# Patient Record
Sex: MALE | Race: WHITE | Employment: OTHER | URBAN - METROPOLITAN AREA
[De-identification: names, ages, dates, MRNs, and addresses within clinical notes are randomized per-mention and may not be internally consistent; named-entity substitution may affect disease eponyms.]

---

## 2023-03-10 ENCOUNTER — APPOINTMENT (OUTPATIENT)
Dept: GENERAL RADIOLOGY | Age: 83
DRG: 871 | End: 2023-03-10
Payer: MEDICARE

## 2023-03-10 ENCOUNTER — HOSPITAL ENCOUNTER (INPATIENT)
Age: 83
LOS: 5 days | Discharge: HOSPICE | DRG: 871 | End: 2023-03-15
Attending: INTERNAL MEDICINE | Admitting: INTERNAL MEDICINE
Payer: MEDICARE

## 2023-03-10 ENCOUNTER — APPOINTMENT (OUTPATIENT)
Dept: CT IMAGING | Age: 83
DRG: 871 | End: 2023-03-10
Payer: MEDICARE

## 2023-03-10 DIAGNOSIS — R41.0 DISORIENTATION: ICD-10-CM

## 2023-03-10 DIAGNOSIS — R77.8 ELEVATED TROPONIN: ICD-10-CM

## 2023-03-10 DIAGNOSIS — I95.9 HYPOTENSION, UNSPECIFIED HYPOTENSION TYPE: ICD-10-CM

## 2023-03-10 DIAGNOSIS — N17.9 AKI (ACUTE KIDNEY INJURY) (HCC): Primary | ICD-10-CM

## 2023-03-10 DIAGNOSIS — T68.XXXA HYPOTHERMIA, INITIAL ENCOUNTER: ICD-10-CM

## 2023-03-10 DIAGNOSIS — J18.9 PNEUMONIA OF LEFT LOWER LOBE DUE TO INFECTIOUS ORGANISM: ICD-10-CM

## 2023-03-10 DIAGNOSIS — T79.6XXA TRAUMATIC RHABDOMYOLYSIS, INITIAL ENCOUNTER (HCC): ICD-10-CM

## 2023-03-10 PROBLEM — R57.9 SHOCK (HCC): Status: ACTIVE | Noted: 2023-03-10

## 2023-03-10 LAB
ABO/RH: NORMAL
ALBUMIN SERPL-MCNC: 3.3 G/DL (ref 3.5–4.6)
ALP BLD-CCNC: 132 U/L (ref 35–104)
ALT SERPL-CCNC: 441 U/L (ref 0–41)
AMMONIA: 45 UMOL/L (ref 16–60)
ANION GAP SERPL CALCULATED.3IONS-SCNC: 25 MEQ/L (ref 9–15)
ANTIBODY SCREEN: NORMAL
APTT: 28.2 SEC (ref 24.4–36.8)
AST SERPL-CCNC: 159 U/L (ref 0–40)
BACTERIA: NEGATIVE /HPF
BANDED NEUTROPHILS RELATIVE PERCENT: 6 %
BASE EXCESS ARTERIAL: -7 (ref -3–3)
BASOPHILS ABSOLUTE: 0 K/UL (ref 0–0.2)
BASOPHILS RELATIVE PERCENT: 0.2 %
BILIRUB SERPL-MCNC: 2.3 MG/DL (ref 0.2–0.7)
BILIRUBIN URINE: ABNORMAL
BLOOD, URINE: ABNORMAL
BUN BLDV-MCNC: 92 MG/DL (ref 8–23)
CALCIUM IONIZED: 1.07 MMOL/L (ref 1.12–1.32)
CALCIUM SERPL-MCNC: 8.8 MG/DL (ref 8.5–9.9)
CHLORIDE BLD-SCNC: 102 MEQ/L (ref 95–107)
CLARITY: ABNORMAL
CO2: 19 MEQ/L (ref 20–31)
COLOR: ABNORMAL
CREAT SERPL-MCNC: 2.63 MG/DL (ref 0.7–1.2)
EOSINOPHILS ABSOLUTE: 0 K/UL (ref 0–0.7)
EOSINOPHILS RELATIVE PERCENT: 0.3 %
EPITHELIAL CELLS, UA: ABNORMAL /HPF (ref 0–5)
ETHANOL PERCENT: NORMAL G/DL
ETHANOL: <10 MG/DL (ref 0–0.08)
GFR SERPL CREATININE-BSD FRML MDRD: 21 ML/MIN/{1.73_M2}
GFR SERPL CREATININE-BSD FRML MDRD: 23.1 ML/MIN/{1.73_M2}
GLOBULIN: 2.9 G/DL (ref 2.3–3.5)
GLUCOSE BLD-MCNC: 100 MG/DL (ref 70–99)
GLUCOSE BLD-MCNC: 116 MG/DL
GLUCOSE BLD-MCNC: 116 MG/DL (ref 70–99)
GLUCOSE BLD-MCNC: 149 MG/DL (ref 70–99)
GLUCOSE URINE: NEGATIVE MG/DL
HCO3 ARTERIAL: 19 MMOL/L (ref 21–29)
HCT VFR BLD CALC: 41 % (ref 42–52)
HEMOGLOBIN: 10.3 GM/DL (ref 13.5–17.5)
HEMOGLOBIN: 13.6 G/DL (ref 14–18)
HYALINE CASTS: ABNORMAL /HPF (ref 0–5)
INR BLD: 1.2
KETONES, URINE: 15 MG/DL
LACTATE: 1.29 MMOL/L (ref 0.4–2)
LACTIC ACID, SEPSIS: 2 MMOL/L (ref 0.5–1.9)
LACTIC ACID: 4.4 MMOL/L (ref 0.5–2.2)
LEUKOCYTE ESTERASE, URINE: ABNORMAL
LYMPHOCYTES ABSOLUTE: 0.4 K/UL (ref 1–4.8)
LYMPHOCYTES RELATIVE PERCENT: 3 %
MAGNESIUM: 3 MG/DL (ref 1.7–2.4)
MCH RBC QN AUTO: 32 PG (ref 27–31.3)
MCHC RBC AUTO-ENTMCNC: 33 % (ref 33–37)
MCV RBC AUTO: 96.9 FL (ref 79–92.2)
MONOCYTES ABSOLUTE: 0.5 K/UL (ref 0.2–0.8)
MONOCYTES RELATIVE PERCENT: 3.8 %
NEUTROPHILS ABSOLUTE: 12.1 K/UL (ref 1.4–6.5)
NEUTROPHILS RELATIVE PERCENT: 88 %
NITRITE, URINE: NEGATIVE
O2 SAT, ARTERIAL: 91 % (ref 93–100)
PCO2 ARTERIAL: 34 MM HG (ref 35–45)
PDW BLD-RTO: 14.3 % (ref 11.5–14.5)
PERFORMED ON: ABNORMAL
PERFORMED ON: ABNORMAL
PH ARTERIAL: 7.36 (ref 7.35–7.45)
PH UA: 5.5 (ref 5–9)
PLATELET # BLD: 104 K/UL (ref 130–400)
PLATELET SLIDE REVIEW: ABNORMAL
PO2 ARTERIAL: 62 MM HG (ref 75–108)
POC CHLORIDE: 117 MEQ/L (ref 99–110)
POC CREATININE WHOLE BLOOD: 2.9
POC CREATININE: 2.8 MG/DL (ref 0.8–1.3)
POC FIO2: 3
POC HEMATOCRIT: 30 % (ref 41–53)
POC POTASSIUM: 2.8 MEQ/L (ref 3.5–5.1)
POC SAMPLE TYPE: ABNORMAL
POC SODIUM: 149 MEQ/L (ref 136–145)
POIKILOCYTES: ABNORMAL
POTASSIUM SERPL-SCNC: 3.6 MEQ/L (ref 3.4–4.9)
PROCALCITONIN: 1.9 NG/ML (ref 0–0.15)
PROTEIN UA: 30 MG/DL
PROTHROMBIN TIME: 15.1 SEC (ref 12.3–14.9)
RBC # BLD: 4.23 M/UL (ref 4.7–6.1)
RBC UA: ABNORMAL /HPF (ref 0–5)
SODIUM BLD-SCNC: 146 MEQ/L (ref 135–144)
SPECIFIC GRAVITY UA: 1.02 (ref 1–1.03)
TCO2 ARTERIAL: 20 MMOL/L (ref 21–32)
TOTAL CK: 2753 U/L (ref 0–190)
TOTAL PROTEIN: 6.2 G/DL (ref 6.3–8)
TROPONIN: 0.02 NG/ML (ref 0–0.01)
UROBILINOGEN, URINE: 1 E.U./DL
WBC # BLD: 12.9 K/UL (ref 4.8–10.8)
WBC UA: ABNORMAL /HPF (ref 0–5)

## 2023-03-10 PROCEDURE — 96365 THER/PROPH/DIAG IV INF INIT: CPT

## 2023-03-10 PROCEDURE — 6830039000 HC L3 TRAUMA ALERT

## 2023-03-10 PROCEDURE — 71250 CT THORAX DX C-: CPT

## 2023-03-10 PROCEDURE — 87040 BLOOD CULTURE FOR BACTERIA: CPT

## 2023-03-10 PROCEDURE — 2580000003 HC RX 258

## 2023-03-10 PROCEDURE — 93005 ELECTROCARDIOGRAM TRACING: CPT | Performed by: SURGERY

## 2023-03-10 PROCEDURE — 73502 X-RAY EXAM HIP UNI 2-3 VIEWS: CPT

## 2023-03-10 PROCEDURE — 2580000003 HC RX 258: Performed by: PHYSICIAN ASSISTANT

## 2023-03-10 PROCEDURE — 6360000002 HC RX W HCPCS: Performed by: PHYSICIAN ASSISTANT

## 2023-03-10 PROCEDURE — 87186 SC STD MICRODIL/AGAR DIL: CPT

## 2023-03-10 PROCEDURE — 71045 X-RAY EXAM CHEST 1 VIEW: CPT

## 2023-03-10 PROCEDURE — 2500000003 HC RX 250 WO HCPCS: Performed by: EMERGENCY MEDICINE

## 2023-03-10 PROCEDURE — 81003 URINALYSIS AUTO W/O SCOPE: CPT

## 2023-03-10 PROCEDURE — 74176 CT ABD & PELVIS W/O CONTRAST: CPT

## 2023-03-10 PROCEDURE — 2580000003 HC RX 258: Performed by: NURSE PRACTITIONER

## 2023-03-10 PROCEDURE — 82140 ASSAY OF AMMONIA: CPT

## 2023-03-10 PROCEDURE — 2580000003 HC RX 258: Performed by: EMERGENCY MEDICINE

## 2023-03-10 PROCEDURE — 99285 EMERGENCY DEPT VISIT HI MDM: CPT

## 2023-03-10 PROCEDURE — 85730 THROMBOPLASTIN TIME PARTIAL: CPT

## 2023-03-10 PROCEDURE — 86901 BLOOD TYPING SEROLOGIC RH(D): CPT

## 2023-03-10 PROCEDURE — 99222 1ST HOSP IP/OBS MODERATE 55: CPT | Performed by: SURGERY

## 2023-03-10 PROCEDURE — 87150 DNA/RNA AMPLIFIED PROBE: CPT

## 2023-03-10 PROCEDURE — 85025 COMPLETE CBC W/AUTO DIFF WBC: CPT

## 2023-03-10 PROCEDURE — 86900 BLOOD TYPING SEROLOGIC ABO: CPT

## 2023-03-10 PROCEDURE — 72128 CT CHEST SPINE W/O DYE: CPT

## 2023-03-10 PROCEDURE — 2700000000 HC OXYGEN THERAPY PER DAY

## 2023-03-10 PROCEDURE — 84145 PROCALCITONIN (PCT): CPT

## 2023-03-10 PROCEDURE — 96361 HYDRATE IV INFUSION ADD-ON: CPT

## 2023-03-10 PROCEDURE — 83605 ASSAY OF LACTIC ACID: CPT

## 2023-03-10 PROCEDURE — 84295 ASSAY OF SERUM SODIUM: CPT

## 2023-03-10 PROCEDURE — 82565 ASSAY OF CREATININE: CPT

## 2023-03-10 PROCEDURE — 82077 ASSAY SPEC XCP UR&BREATH IA: CPT

## 2023-03-10 PROCEDURE — 36600 WITHDRAWAL OF ARTERIAL BLOOD: CPT

## 2023-03-10 PROCEDURE — 02HV33Z INSERTION OF INFUSION DEVICE INTO SUPERIOR VENA CAVA, PERCUTANEOUS APPROACH: ICD-10-PCS | Performed by: EMERGENCY MEDICINE

## 2023-03-10 PROCEDURE — 82435 ASSAY OF BLOOD CHLORIDE: CPT

## 2023-03-10 PROCEDURE — 96368 THER/DIAG CONCURRENT INF: CPT

## 2023-03-10 PROCEDURE — 96360 HYDRATION IV INFUSION INIT: CPT

## 2023-03-10 PROCEDURE — 51702 INSERT TEMP BLADDER CATH: CPT

## 2023-03-10 PROCEDURE — 82330 ASSAY OF CALCIUM: CPT

## 2023-03-10 PROCEDURE — 72131 CT LUMBAR SPINE W/O DYE: CPT

## 2023-03-10 PROCEDURE — 86850 RBC ANTIBODY SCREEN: CPT

## 2023-03-10 PROCEDURE — 72125 CT NECK SPINE W/O DYE: CPT

## 2023-03-10 PROCEDURE — 73552 X-RAY EXAM OF FEMUR 2/>: CPT

## 2023-03-10 PROCEDURE — 80053 COMPREHEN METABOLIC PANEL: CPT

## 2023-03-10 PROCEDURE — 83735 ASSAY OF MAGNESIUM: CPT

## 2023-03-10 PROCEDURE — 36415 COLL VENOUS BLD VENIPUNCTURE: CPT

## 2023-03-10 PROCEDURE — 85610 PROTHROMBIN TIME: CPT

## 2023-03-10 PROCEDURE — 82550 ASSAY OF CK (CPK): CPT

## 2023-03-10 PROCEDURE — 2000000000 HC ICU R&B

## 2023-03-10 PROCEDURE — 84132 ASSAY OF SERUM POTASSIUM: CPT

## 2023-03-10 PROCEDURE — 70450 CT HEAD/BRAIN W/O DYE: CPT

## 2023-03-10 PROCEDURE — 82803 BLOOD GASES ANY COMBINATION: CPT

## 2023-03-10 PROCEDURE — 36556 INSERT NON-TUNNEL CV CATH: CPT

## 2023-03-10 PROCEDURE — 85014 HEMATOCRIT: CPT

## 2023-03-10 PROCEDURE — 84484 ASSAY OF TROPONIN QUANT: CPT

## 2023-03-10 RX ORDER — CYANOCOBALAMIN 1000 UG/ML
INJECTION, SOLUTION INTRAMUSCULAR; SUBCUTANEOUS
Status: ON HOLD | COMMUNITY
Start: 2023-02-16 | End: 2024-01-18

## 2023-03-10 RX ORDER — ASPIRIN 81 MG/1
TABLET ORAL DAILY
Status: ON HOLD | COMMUNITY
Start: 2011-04-13

## 2023-03-10 RX ORDER — TRIAMTERENE AND HYDROCHLOROTHIAZIDE 37.5; 25 MG/1; MG/1
CAPSULE ORAL
Status: ON HOLD | COMMUNITY
Start: 2022-11-02

## 2023-03-10 RX ORDER — APIXABAN 5 MG/1
TABLET, FILM COATED ORAL
Status: ON HOLD | COMMUNITY
Start: 2023-02-18

## 2023-03-10 RX ORDER — PRAVASTATIN SODIUM 20 MG
TABLET ORAL
Status: ON HOLD | COMMUNITY
Start: 2022-04-28

## 2023-03-10 RX ORDER — 0.9 % SODIUM CHLORIDE 0.9 %
1000 INTRAVENOUS SOLUTION INTRAVENOUS ONCE
Status: COMPLETED | OUTPATIENT
Start: 2023-03-10 | End: 2023-03-10

## 2023-03-10 RX ORDER — SODIUM CHLORIDE 9 MG/ML
INJECTION, SOLUTION INTRAVENOUS
Status: COMPLETED
Start: 2023-03-10 | End: 2023-03-10

## 2023-03-10 RX ORDER — METOPROLOL SUCCINATE 25 MG/1
TABLET, EXTENDED RELEASE ORAL DAILY
Status: ON HOLD | COMMUNITY
Start: 2023-01-12

## 2023-03-10 RX ORDER — 0.9 % SODIUM CHLORIDE 0.9 %
1000 INTRAVENOUS SOLUTION INTRAVENOUS ONCE
Status: COMPLETED | OUTPATIENT
Start: 2023-03-10 | End: 2023-03-11

## 2023-03-10 RX ORDER — PRAVASTATIN SODIUM 20 MG
TABLET ORAL
Status: ON HOLD | COMMUNITY
Start: 2023-02-10

## 2023-03-10 RX ORDER — LEVOFLOXACIN 5 MG/ML
750 INJECTION, SOLUTION INTRAVENOUS
Status: DISCONTINUED | OUTPATIENT
Start: 2023-03-10 | End: 2023-03-11

## 2023-03-10 RX ORDER — LEVOTHYROXINE SODIUM 0.12 MG/1
TABLET ORAL
Status: ON HOLD | COMMUNITY
Start: 2022-04-28

## 2023-03-10 RX ORDER — ENOXAPARIN SODIUM 100 MG/ML
1 INJECTION SUBCUTANEOUS DAILY
Status: DISCONTINUED | OUTPATIENT
Start: 2023-03-11 | End: 2023-03-15 | Stop reason: HOSPADM

## 2023-03-10 RX ADMIN — SODIUM CHLORIDE: 9 INJECTION, SOLUTION INTRAVENOUS at 23:30

## 2023-03-10 RX ADMIN — AZITHROMYCIN MONOHYDRATE 500 MG: 500 INJECTION, POWDER, LYOPHILIZED, FOR SOLUTION INTRAVENOUS at 20:21

## 2023-03-10 RX ADMIN — SODIUM CHLORIDE 1000 ML: 9 INJECTION, SOLUTION INTRAVENOUS at 18:09

## 2023-03-10 RX ADMIN — CEFTRIAXONE SODIUM 1000 MG: 1 INJECTION, POWDER, FOR SOLUTION INTRAMUSCULAR; INTRAVENOUS at 19:32

## 2023-03-10 RX ADMIN — SODIUM CHLORIDE 1000 ML: 9 INJECTION, SOLUTION INTRAVENOUS at 23:37

## 2023-03-10 RX ADMIN — SODIUM CHLORIDE 1000 ML: 9 INJECTION, SOLUTION INTRAVENOUS at 15:13

## 2023-03-10 RX ADMIN — SODIUM CHLORIDE 1000 ML: 9 INJECTION, SOLUTION INTRAVENOUS at 16:20

## 2023-03-10 RX ADMIN — NOREPINEPHRINE BITARTRATE 5 MCG/MIN: 1 INJECTION INTRAVENOUS at 19:47

## 2023-03-10 RX ADMIN — SODIUM CHLORIDE 1000 ML: 9 INJECTION, SOLUTION INTRAVENOUS at 17:30

## 2023-03-10 ASSESSMENT — ENCOUNTER SYMPTOMS
ABDOMINAL PAIN: 0
ABDOMINAL DISTENTION: 0
RHINORRHEA: 0
CONSTIPATION: 0
COLOR CHANGE: 0
EYE DISCHARGE: 0
SHORTNESS OF BREATH: 0
SORE THROAT: 0

## 2023-03-10 ASSESSMENT — LIFESTYLE VARIABLES: HOW OFTEN DO YOU HAVE A DRINK CONTAINING ALCOHOL: NEVER

## 2023-03-10 NOTE — ED NOTES
Waiting on BP to increase to go to CT     Negar Sorto RN  03/10/23 1052       Negar Sorto RN  03/10/23 [id number]

## 2023-03-10 NOTE — CONSULTS
Trauma Consult / H & P Note    Reason for Consult: Trauma  Consulting Provider: No att. providers found    BASIC INJURY INFORMATION:  Level of activation: CAT 2  Mode of transport: EMS  Mechanism of injury:  Found down, presumed fall from standing  Complicating features: NA  Protective measures: NA    HISTORY OF PRESENT INJURY:   Lakeisha Curry is a 78 y.o. 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 History is provided by the son. He reports that he believes his dad was down for ~4 days due to the number of newspapers on the porch. Due to the disruption in the bathroom, it is believed that the patient fell there and was unable to get up. Pt was covered in his own feces and urine. EMS had to kick down the door. Pt is awake but non-verbal and not following commands. PRIMARY SURVEY:  Airway: Intact  Breathing: Normal   Breath Sounds: Breath Sounds Equal Bilaterally  Circulation:    Pulses: Normal   Skin: Normal skin color, texture and turgor  Disability:   Pupils: Pt would open his eyes briefly but then squeeze them shut    GCS:    Best Eyes: 4    Best Verbal: 2    Best Motor: 4    Total: 10    Vitals:   Vitals:    03/10/23 1735 03/10/23 1740 03/10/23 1746 03/10/23 1748   BP: (!) 70/59 (!) 66/47 (!) 74/59    Pulse: 79 79 67    Resp: 19 26 26    Temp:   (!) 90.9 °F (32.7 °C)    TempSrc:    Bladder   SpO2:       Weight:       Height:             SECONDARY SURVEY:  Neurologic: Alert and Oriented to self, Moves all Extremities, Strength Symmetrical. Not following commands. HEENT:   Head: No lacerations, bony step-offs, or abrasions and Midface stable to palpation   Eyes: EOM intact. Unable to examine pupils due to patient squeezing his eyes shut   Ears: No Hemotympanum   Nose: Septum Midline, No crepitus with motion;    Throat: Oral cavity without trauma   Neck: No midline tenderness and No lacerations/wounds  Pulmonary: External exam: no crepitus or pain with palpation, no contusions or abrasions; and Lung exam: breath sounds clear, no wheezes, no rales  Cardiovascular:    Pulses: Bilateral radial, femoral, DP and PT pulses are normal;  Abdomen: Appearance: Non-distended, No scars, lacerations, contusions; and Palpation: no tenderness   Rectal: Not performed  Pelvis/Perineum: Normal appearing genitalia, Pelvis is stable to palpation;, and No blood noted at the urethral meatus;  Musculoskeletal:    Back/Spine: Noted to have lower back bruising   Extremities: Scattered bruising and skin tears to extremities. Moves all extremities.  No obvious deformities    PAST MEDICAL HISTORY: (obtained from son at bedside and care everywhere)  DVT lower extremities (12/2022)  HTN  HLD  Hypothyroidism  Skin cancer  Aortic stenosis     PAST SURGICAL HISTORY:  Multiple skin cancer removals    PRE-ADMISSION MEDICATIONS:   ASA 81mg  Synthroid  Eliquis  Pravastatin  Metoprolol    ALLERGIES:  Son denies any allergies     SOCIAL HISTORY:   Denies tobacco, alcohol and drug use    FAMILY HISTORY:  No family history of bleeding or clotting disorders      REVIEW OF SYSTEMS: Unable to obtain secondary to mental status       BASIC LABS:   CBC with Differential:    Lab Results   Component Value Date/Time    WBC 12.9 03/10/2023 02:30 PM    RBC 4.23 03/10/2023 02:30 PM    HGB 13.6 03/10/2023 02:30 PM    HCT 41.0 03/10/2023 02:30 PM     03/10/2023 02:30 PM    MCV 96.9 03/10/2023 02:30 PM    MCH 32.0 03/10/2023 02:30 PM    MCHC 33.0 03/10/2023 02:30 PM    RDW 14.3 03/10/2023 02:30 PM    BANDSPCT 6 03/10/2023 02:30 PM    LYMPHOPCT 3.0 03/10/2023 02:30 PM    MONOPCT 3.8 03/10/2023 02:30 PM    BASOPCT 0.2 03/10/2023 02:30 PM    MONOSABS 0.5 03/10/2023 02:30 PM    LYMPHSABS 0.4 03/10/2023 02:30 PM    EOSABS 0.0 03/10/2023 02:30 PM    BASOSABS 0.0 03/10/2023 02:30 PM CMP:   Lab Results   Component Value Date     (H) 03/10/2023    K 3.6 03/10/2023     03/10/2023    CO2 19 (L) 03/10/2023    BUN 92 (HH) 03/10/2023    CREATININE 2.63 (H) 03/10/2023    GLUCOSE 116 03/10/2023    CALCIUM 8.8 03/10/2023    PROT 6.2 (L) 03/10/2023    LABALBU 3.3 (L) 03/10/2023    BILITOT 2.3 (H) 03/10/2023    ALKPHOS 132 (H) 03/10/2023     (H) 03/10/2023     (H) 03/10/2023    LABGLOM 23.1 (L) 03/10/2023    GLOB 2.9 03/10/2023     Magnesium:   Lab Results   Component Value Date/Time    MG 3.0 03/10/2023 02:30 PM     Troponin:   Lab Results   Component Value Date/Time    TROPONINI 0.020 03/10/2023 02:30 PM     PT/INR:   Recent Labs     03/10/23  1430   PROTIME 15.1*   INR 1.2     APTT:   Recent Labs     03/10/23  1430   APTT 28.2     EtOH:   Lab Results   Component Value Date/Time    ETOH <10 03/10/2023 05:30 PM       RADIOLOGY: (Personally reviewed)  CT Head: Negative    CT C-Spine: Negative    CT T spine: Negative    CT L spine: Negative    CT Chest: Negative for trauma    CT Abdomen/Pelvis: Negative    XR Right hip: Negative    XR R femur: Negative      ASSESSMENT:  Lakeisha Curry is a [place] 1 y.o. 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 On exam, patient has scattered bruising and skin tears to his extremities and torso. GCS 10. A&O to self only. Pt was hypothermic on arrival and was placed on bear hugger and warm fluids infusing. Pt was initially normotensive but became hypotensive. FAST exam negative for free fluid. Pressures improved with IV fluid administration. Lab work reviewed and remarkable for leukocytosis 12.9, mild anemia, thrombocytopenia 104. Hypernatremia 146. BUN/Cr elevated at 92/2. 63. T bili 2.3, Alk phos 132, AST/Alt elevated at 441/159. Lactic acidosis 4.4.  Pro-tamera elevated at 1.90. Elevated troponin at 0.020. CK elevated at 2,753. Trauma workup found No injury      PLAN:  No indication for admission to trauma  Dispo per ED        Tea Hollingsworth MD  Trauma/Critical Care/General Surgery  [See treatment team sticky note for contact information]     This patient's plan of care was discussed and made in collaboration with Trauma Attending physician, Tea Hollingsworth MD.    Teaching Physician Note:  I saw and evaluated the patient. I personally obtained the key and critical portions of the history and physical exam.  I reviewed the GIUSEPPE's documentation and discussed the patient with the GIUSEPPE. I agree with the GIUSEPPE's medical decision making as documented in the GIUSEPPE note.         Tea Hollingsworth MD

## 2023-03-10 NOTE — ED PROVIDER NOTES
[address] [facility] ED  eMERGENCY dEPARTMENT eNCOUnter      Pt Name: Marina Barnard  MRN: [id number]  [place] 1940  Date of evaluation: 3/10/2023  Provider: Talib Chambers PA-C    CHIEF COMPLAINT       Chief Complaint   Patient presents with    Fall           HISTORY OF PRESENT ILLNESS   (Location/Symptom, Timing/Onset,Context/Setting, Quality, Duration, Modifying Factors, Severity)  Note limiting factors. Marina Barnard is a 78 y.o. male who presents to the emergency department as concerns for fall, altered mental status. According to EMS family has not been able to contact this patient for the last couple of days, they went to his house to check on him, he could not get in the door was locked, they states there were 4 newspapers on his front porch, believing that he may have been down in his home for possibly 4 days or more. When they got into the house, was patient was in the bathroom trapped behind the door, he was confused, he could not get up on his own, they state he was soaked in urine and feces wallpaper and shower curtain were torn down from the walls, as well as the bathroom. There was no broken appliances in the bathroom. Patient is alert, he does respond to in full stimuli, but does not verbalize. Does not follow commands. Bruises over multiple areas of body. Undetermined whether. Patient is on anticoagulation at this time. HPI    NursingNotes were reviewed. REVIEW OF SYSTEMS    (2-9 systems for level 4, 10 or more for level 5)     Review of Systems   Constitutional:  Negative for activity change and appetite change. HENT:  Negative for congestion, ear discharge, ear pain, nosebleeds, rhinorrhea and sore throat. Eyes:  Negative for discharge. Respiratory:  Negative for shortness of breath. Cardiovascular:  Negative for chest pain, palpitations and leg swelling. Gastrointestinal:  Negative for abdominal distention, abdominal pain and constipation.    Genitourinary: Negative for difficulty urinating and dysuria. Musculoskeletal:  Negative for arthralgias. Right hip pain   Skin:  Negative for color change. Neurological:  Negative for dizziness, syncope, numbness and headaches. Altered mental status   Psychiatric/Behavioral:  Negative for agitation and confusion. Except as noted above the remainder of the review of systems was reviewed and negative. PAST MEDICAL HISTORY   No past medical history on file. SURGICALHISTORY     No past surgical history on file. CURRENT MEDICATIONS       Previous Medications    ASPIRIN 81 MG EC TABLET    Take by mouth daily    CYANOCOBALAMIN 1000 MCG/ML INJECTION    Inject into the muscle    ELIQUIS 5 MG TABS TABLET    TAKE 1 TABLET BY MOUTH TWICE A DAY    LEVOTHYROXINE (SYNTHROID) 125 MCG TABLET    TAKE 1 TABLET BY MOUTH EVERY DAY    METOPROLOL SUCCINATE (TOPROL XL) 25 MG EXTENDED RELEASE TABLET    Take by mouth daily    PRAVASTATIN (PRAVACHOL) 20 MG TABLET    TAKE 1 TABLET BY MOUTH EVERYDAY AT BEDTIME    PRAVASTATIN (PRAVACHOL) 20 MG TABLET    TAKE 1 TABLET BY MOUTH EVERYDAY AT BEDTIME    TRIAMTERENE-HYDROCHLOROTHIAZIDE (DYAZIDE) 37.5-25 MG PER CAPSULE    TAKE 1 CAPSULE BY MOUTH EVERY DAY            Patient has no known allergies. FAMILY HISTORY     No family history on file. SOCIAL HISTORY       Social History     Socioeconomic History    Marital status:        SCREENINGS    Mar[place] Coma Scale  Eye Opening: To pain  Best Verbal Response: Incomprehensible speech  Best Motor Response: Localizes pain  Marion Coma Scale Score: 9        PHYSICAL EXAM    (up to 7 for level 4, 8 or more for level 5)     ED Triage Vitals   BP Temp Temp src Pulse Resp SpO2 Height Weight   -- -- -- -- -- -- -- --       Physical Exam  Vitals and nursing note reviewed. Constitutional:       General: He is not in acute distress. Appearance: He is well-developed.  He is not ill-appearing, toxic-appearing or diaphoretic. HENT:      Head: Normocephalic. Comments: Patient has dried mucus noted around eyes, as well as dried crusted blood around mouth. No visible signs of injuries to head face or scalp, no cut scrapes abrasions or depressions are noted. Nose: Nose normal. No congestion. Mouth/Throat:      Mouth: Mucous membranes are moist.      Pharynx: No oropharyngeal exudate or posterior oropharyngeal erythema. Comments: Dried crusted blood around mouth and lips. Eyes:      Extraocular Movements: Extraocular movements intact. Conjunctiva/sclera: Conjunctivae normal.      Pupils: Pupils are equal, round, and reactive to light. Comments: Patient on cooperative able to accurately evaluate pupillary response. Neck:      Vascular: No JVD. Trachea: No tracheal deviation. Comments: Patient nonverbal, does not elicit any pain, no facial grimace on palpation to cervical spine, he is moving neck freely at this point. Cardiovascular:      Rate and Rhythm: Normal rate. Pulses: Normal pulses. Heart sounds: Normal heart sounds. No murmur heard. No friction rub. No gallop. Pulmonary:      Effort: Pulmonary effort is normal. No tachypnea, accessory muscle usage, respiratory distress or retractions. Breath sounds: No stridor. No wheezing, rhonchi or rales. Comments: Lung sounds are clear in all fields, there is no wheezes rales or rhonchi, no accessory muscle use, there is purple bruising noted to right chest wall lateral area 10th rib area, patient does have pain on palpation, there is no crepitus or instability no paradoxical movement, patient also has pain on palpation to the posterior aspect of right chest wall, large bruise to the mid back thoracic region. Chest:      Chest wall: No tenderness. Abdominal:      General: Abdomen is flat. Bowel sounds are normal. There is no distension or abdominal bruit. Palpations:  There is no shifting dullness, fluid wave, hepatomegaly, splenomegaly, mass or pulsatile mass.      Tenderness: There is no abdominal tenderness. There is no right CVA tenderness, left CVA tenderness, guarding or rebound. Negative signs include Johnson's sign, Rovsing's sign and McBurney's sign.      Comments: Abdomen soft and nondistended, there is a large purple bruise in the area of the right flank   Musculoskeletal:         General: No deformity.        Arms:       Cervical back: Normal range of motion and neck supple. No rigidity.        Legs:       Comments: Patient responds to painful stimuli only he does not follow commands, he does not elicit any facial grimace on palpation to the cervical, thoracic, or lumbar spine, pelvis seems to be stable there is bruising and noted to the mid pelvic region, no shortening or rotation is to bilateral lower extremities.  Multiple areas of bruising to posterior aspect of legs.  Patient moves knees ankles and feet well, they are cool to the touch states there entire body.  Positive dorsalis pedis pulses, no pain across shoulders, humerus, wrist hand or elbows.  Moving all extremities well.  Again does not follow commands.   Skin:     General: Skin is warm and dry.      Capillary Refill: Capillary refill takes less than 2 seconds.      Coloration: Skin is not jaundiced.   Neurological:      General: No focal deficit present.      Mental Status: He is alert and oriented to person, place, and time. Mental status is at baseline.      Cranial Nerves: No cranial nerve deficit.      Sensory: No sensory deficit.      Motor: No weakness.      Coordination: Coordination normal.   Psychiatric:         Mood and Affect: Mood normal.       RESULTS     EKG: All EKG's are interpreted by the Emergency Department Physician who either signs or Co-signsthis chart in the absence of a cardiologist.    EKG shows normal sinus rhythm 66 bpm nonspecific T wave abnormality no ventricular ectopy QTc 559 ms    RADIOLOGY:   Non-plain  filmimages such as CT, Ultrasound and MRI are read by the radiologist. Plain radiographic images are visualized and preliminarily interpreted by the emergency physician with the below findings:        Interpretation per the Radiologist below, if available at the time ofthis note:    XR FEMUR RIGHT (MIN 2 VIEWS)   Final Result   No acute osseous abnormality. XR HIP 2-3 VW W PELVIS RIGHT   Final Result   No acute abnormality of the pelvis and right hip. CT Head W/O Contrast   Final Result   CT HEAD WITHOUT CONTRAST:      1. No skull fracture or acute intracranial abnormality. CT CERVICAL SPINE WITHOUT CONTRAST:      1. No fracture or joint dislocation is seen in the cervical spine. 2. Mild age indeterminate compression fracture deformity in the T1 vertebral   body. If indicated, MRI may be obtained for further evaluation. 3. Degenerative changes, as described. CT CERVICAL SPINE WO CONTRAST   Final Result   CT HEAD WITHOUT CONTRAST:      1. No skull fracture or acute intracranial abnormality. CT CERVICAL SPINE WITHOUT CONTRAST:      1. No fracture or joint dislocation is seen in the cervical spine. 2. Mild age indeterminate compression fracture deformity in the T1 vertebral   body. If indicated, MRI may be obtained for further evaluation. 3. Degenerative changes, as described. CT THORACIC SPINE WO CONTRAST   Final Result   No evidence of acute thoracic spine trauma. CT LUMBAR SPINE WO CONTRAST   Final Result   No evidence of acute lumbar spine trauma. Multilevel degenerative changes. CT CHEST WO CONTRAST   Final Result   Atraumatic appearance of the chest.      Patchy right upper lobe opacities consistent with pneumonia. CT ABDOMEN PELVIS WO CONTRAST Additional Contrast? None   Final Result   Atraumatic appearance of the abdomen and pelvis. Prostatomegaly. Right renal atrophy.                ED BEDSIDE ULTRASOUND:   Performed by ED Physician - none    LABS:  Labs Reviewed   CBC WITH AUTO DIFFERENTIAL - Abnormal; Notable for the following components:       Result Value    WBC 12.9 (*)     RBC 4.23 (*)     Hemoglobin 13.6 (*)     Hematocrit 41.0 (*)     MCV 96.9 (*)     MCH 32.0 (*)     Platelets 241 (*)     Neutrophils Absolute 12.1 (*)     Lymphocytes Absolute 0.4 (*)     All other components within normal limits   COMPREHENSIVE METABOLIC PANEL - Abnormal; Notable for the following components:    Sodium 146 (*)     CO2 19 (*)     Anion Gap 25 (*)     Glucose 149 (*)     BUN 92 (*)     Creatinine 2.63 (*)     Est, Glom Filt Rate 23.1 (*)     Total Protein 6.2 (*)     Albumin 3.3 (*)     Total Bilirubin 2.3 (*)     Alkaline Phosphatase 132 (*)      (*)      (*)     All other components within normal limits    Narrative:     CALL  [facility] tel[facility] [phone],  BUN results called to and read back by DR MICHELE SIDDIQUI, 03/10/2023 15:44, by Yany Perea   LACTIC ACID - Abnormal; Notable for the following components:    Lactic Acid 4.4 (*)     All other components within normal limits    Narrative:     CALL  [facility] [employer] tel. [phone],  LACID  results called to and read back by DR MICHELE SIDDIQUI, 03/10/2023 15:44, by  Yany Perea   PROCALCITONIN - Abnormal; Notable for the following components:    Procalcitonin 1.90 (*)     All other components within normal limits    Narrative:     CALL  [employer] [employer] tel. [phone],  TROP results called to and read back by DR MICHELE SIDDIQUI, 03/10/2023 15:44, by Yany Perea  BUN results called to and read back by DR MICHELE SIDDIQUI, 03/10/2023 15:44, by Morris Johnston - Abnormal; Notable for the following components:    Color, UA DARK YELLOW (*)     Clarity, UA CLOUDY (*)     Bilirubin Urine MODERATE (*)     Ketones, Urine 15 (*)     Blood, Urine SMALL (*)     Protein, UA 30 (*)     Leukocyte Esterase, Urine TRACE (*)     All other components within normal limits   PROTIME-INR - Abnormal; Notable for the following components:    Protime 15.1 (*)     All other components within normal limits   TROPONIN - Abnormal; Notable for the following components:    Troponin 0.020 (*)     All other components within normal limits    Narrative:     Michel Villa  [facility] tel. [phone],  TROP results called to and read back by DR MICHELE SIDDIQUI, 03/10/2023 15:44, by Summer BARTLETT results called to and read back by DR MICHELE SIDDIQUI, 03/10/2023 15:44, by Summer Rainey   CK - Abnormal; Notable for the following components: Total CK 2,753 (*)     All other components within normal limits    Narrative:     Michel Villa  [facility] tel. [phone],  TROP results called to and read back by DR MICHELE SIDDIQUI, 03/10/2023 15:44, by Summer BARTLETT results called to and read back by DR MICHELE SIDDIQUI, 03/10/2023 15:44, by Slick Adam - Abnormal; Notable for the following components:    Magnesium 3.0 (*)     All other components within normal limits   POCT GLUCOSE - Abnormal; Notable for the following components:    POC Glucose 116 (*)     All other components within normal limits   POCT GLUCOSE - Normal   POCT CREATININE - Normal   CULTURE, BLOOD 1   CULTURE, BLOOD 1   APTT   ETHANOL   AMMONIA   MICROSCOPIC URINALYSIS   TYPE AND SCREEN       All other labs were within normal range or not returned as of this dictation. EMERGENCY DEPARTMENT COURSE and DIFFERENTIAL DIAGNOSIS/MDM:   Vitals:    Vitals:    03/10/23 1746 03/10/23 1748 03/10/23 1806 03/10/23 1810   BP: (!) 74/59  (!) 70/47    Pulse: 67  68    Resp: 26  25    Temp: (!) 90.9 °F (32.7 °C)   (!) 91.6 °F (33.1 °C)   TempSrc:  Bladder  Bladder   SpO2:       Weight:       Height:                Medical Decision Making  Trauma services notified for patient evaluation.     Patient was found by EMS tripped in his bathroom, states that the son has been trying to make contact with him over the last 2 to 3 days and has not been able to reach him, he went to the house to check on him, found for newspaper sitting on his front porch, they believe he has been trapped in the bathroom for 4 days, he was found laying on the floor unable to get up, he has sores and bruising over numerous areas of his body, he was confused on arrival to the hospital, and essentially only responded to painful stimuli. After he received IV hydration he did become more alert, he was able to talk somewhat, and seem more responsive. He is moving all extremities well. CT scans show no acute process at this time, there is concerns for left lower lobe pneumonia. He was started on antibiotics. He is receiving IV fluids for acute kidney injury, rhabdomyolysis. Patient was seen by trauma services and was cleared for admission through medicine. Patient is hypothermic on arrival with a temperature of 88 degrees and was placed on warming blanket receiving warmed IV fluids. Patient will be admitted into the intensive care unit for further evaluation and management. Amount and/or Complexity of Data Reviewed  Labs: ordered. Radiology: ordered. Risk  Prescription drug management. Decision regarding hospitalization. Coding     CONSULTS:  None    PROCEDURES:  Unless otherwise noted below, none     Central Line    Date/Time: 3/10/2023 7:13 PM  Performed by: Tori Toney DO  Authorized by: Tori Toney DO     Consent:     Consent obtained:  Emergent situation  Universal protocol:     Procedure explained and questions answered to patient or proxy's satisfaction: yes      Relevant documents present and verified: yes      Test results available: yes      Imaging studies available: yes      Required blood products, implants, devices, and special equipment available: yes      Site/side marked: yes      Patient identity confirmed:  Arm band  Pre-procedure details:     Indication(s): central venous access      Hand hygiene: Hand hygiene performed prior to insertion      Sterile barrier technique:  All elements of maximal sterile technique followed      Skin preparation:  Chlorhexidine    Skin preparation agent: Skin preparation agent completely dried prior to procedure    Sedation:     Sedation type:  None  Anesthesia:     Anesthesia method:  None  Procedure details:     Location:  R internal jugular    Patient position:  Supine    Procedural supplies:  Triple lumen    Catheter size:  7 Fr    Landmarks identified: yes      Ultrasound guidance: yes      Ultrasound guidance timing: real time      Sterile ultrasound techniques: Sterile gel and sterile probe covers were used      Number of attempts:  2    Successful placement: yes    Post-procedure details:     Post-procedure:  Dressing applied and line sutured    Assessment:  Blood return through all ports, no pneumothorax on x-ray, free fluid flow and placement verified by x-ray    Procedure completion:  Tolerated with difficulty    Complications:  None  Comments:      Patient uncooperative    FINAL IMPRESSION      1. LEORA (acute kidney injury) ([facility] [address]    2. Hypothermia, initial encounter    3. Traumatic rhabdomyolysis, initial encounter ([facility] [address]    4. Elevated troponin    5. Pneumonia of left lower lobe due to infectious organism    6. Disorientation          DISPOSITION/PLAN   DISPOSITION Decision To Admit 03/10/2023 06:05:51 PM      PATIENT REFERRED TO:  No follow-up provider specified.     DISCHARGE MEDICATIONS:  New Prescriptions    No medications on file          (Please note that portions of this note were completed with a voice recognition program.  Efforts were made to edit the dictations but occasionally words are mis-transcribed.)    Toni Dubon PA-C (electronically signed)  Attending Emergency Physician         Frida Sterling DO  03/13/23 1923

## 2023-03-11 LAB
ACINETOBACTER CALCOAC BAUMANNII COMPLEX BY PCR: NOT DETECTED
ADENOVIRUS BY PCR: NOT DETECTED
ALBUMIN SERPL-MCNC: 2.4 G/DL (ref 3.5–4.6)
ALP BLD-CCNC: 99 U/L (ref 35–104)
ALT SERPL-CCNC: 291 U/L (ref 0–41)
AMPHETAMINE SCREEN, URINE: NORMAL
ANION GAP SERPL CALCULATED.3IONS-SCNC: 17 MEQ/L (ref 9–15)
AST SERPL-CCNC: 146 U/L (ref 0–40)
BACTEROIDES FRAGILIS BY PCR: NOT DETECTED
BARBITURATE SCREEN URINE: NORMAL
BASOPHILS ABSOLUTE: 0 K/UL (ref 0–0.2)
BASOPHILS RELATIVE PERCENT: 0 %
BENZODIAZEPINE SCREEN, URINE: NORMAL
BILIRUB SERPL-MCNC: 1.9 MG/DL (ref 0.2–0.7)
BILIRUB SERPL-MCNC: 2 MG/DL (ref 0.2–0.7)
BILIRUBIN DIRECT: 1.3 MG/DL (ref 0–0.4)
BILIRUBIN, INDIRECT: 0.7 MG/DL (ref 0–0.6)
BLOOD CULTURE, ROUTINE: ABNORMAL
BORDETELLA PARAPERTUSSIS BY PCR: NOT DETECTED
BORDETELLA PERTUSSIS BY PCR: NOT DETECTED
BUN BLDV-MCNC: 92 MG/DL (ref 8–23)
CALCIUM SERPL-MCNC: 7.6 MG/DL (ref 8.5–9.9)
CANDIDA ALBICANS BY PCR: NOT DETECTED
CANDIDA AURIS BY PCR: NOT DETECTED
CANDIDA GLABRATA BY PCR: NOT DETECTED
CANDIDA KRUSEI BY PCR: NOT DETECTED
CANDIDA PARAPSILOSIS BY PCR: NOT DETECTED
CANDIDA TROPICALIS BY PCR: NOT DETECTED
CANNABINOID SCREEN URINE: NORMAL
CARBAPENEM RESISTANCE IMP GENE BY PCR: NOT DETECTED
CARBAPENEM RESISTANCE KPC BY PCR: NOT DETECTED
CARBAPENEM RESISTANCE NDM GENE BY PCR: NOT DETECTED
CARBAPENEM RESISTANCE OXA-48 GENE BY PCR: NOT DETECTED
CARBAPENEM RESISTANCE VIM GENE BY PCR: NOT DETECTED
CEPHALOSPORIN RESISTANCE CTX-M GENE BY PCR: NOT DETECTED
CHLAMYDOPHILIA PNEUMONIAE BY PCR: NOT DETECTED
CHLORIDE BLD-SCNC: 114 MEQ/L (ref 95–107)
CO2: 19 MEQ/L (ref 20–31)
COCAINE METABOLITE SCREEN URINE: NORMAL
COLISTIN RESISTANCE MCR-1 GENE BY PCR: NOT DETECTED
CORONAVIRUS 229E BY PCR: NOT DETECTED
CORONAVIRUS HKU1 BY PCR: NOT DETECTED
CORONAVIRUS NL63 BY PCR: NOT DETECTED
CORONAVIRUS OC43 BY PCR: NOT DETECTED
CREAT SERPL-MCNC: 2.9 MG/DL (ref 0.7–1.2)
CRYPTOCOCCUS NEOFORMANS/GATTII BY PCR: NOT DETECTED
ENTEROBACTER CLOACAE COMPLEX BY PCR: NOT DETECTED
ENTEROBACTERALES BY PCR: DETECTED
ENTEROCOCCUS FAECALIS BY PCR: NOT DETECTED
ENTEROCOCCUS FAECIUM BY PCR: NOT DETECTED
EOSINOPHILS ABSOLUTE: 0 K/UL (ref 0–0.7)
EOSINOPHILS RELATIVE PERCENT: 0.1 %
ESCHERICHIA COLI BY PCR: DETECTED
FENTANYL SCREEN, URINE: NORMAL
GFR SERPL CREATININE-BSD FRML MDRD: 20.5 ML/MIN/{1.73_M2}
GFR SERPL CREATININE-BSD FRML MDRD: 21 ML/MIN/{1.73_M2}
GLOBULIN: 2.4 G/DL (ref 2.3–3.5)
GLUCOSE BLD-MCNC: 112 MG/DL (ref 70–99)
GLUCOSE BLD-MCNC: 128 MG/DL (ref 70–99)
GLUCOSE BLD-MCNC: 169 MG/DL (ref 70–99)
HAEMOPHILUS INFLUENZAE BY PCR: NOT DETECTED
HCT VFR BLD CALC: 34.5 % (ref 42–52)
HEMOGLOBIN: 11.6 G/DL (ref 14–18)
HUMAN METAPNEUMOVIRUS BY PCR: NOT DETECTED
HUMAN RHINOVIRUS/ENTEROVIRUS BY PCR: NOT DETECTED
INFLUENZA A BY PCR: NOT DETECTED
INFLUENZA B BY PCR: NOT DETECTED
KLEBSIELLA AEROGENES BY PCR: NOT DETECTED
KLEBSIELLA OXYTOCA BY PCR: NOT DETECTED
KLEBSIELLA PNEUMONIAE GROUP BY PCR: NOT DETECTED
LACTIC ACID, SEPSIS: 1.5 MMOL/L (ref 0.5–1.9)
LACTIC ACID: 1.5 MMOL/L (ref 0.5–2.2)
LISTERIA MONOCYTOGENES BY PCR: NOT DETECTED
LYMPHOCYTES ABSOLUTE: 0.2 K/UL (ref 1–4.8)
LYMPHOCYTES RELATIVE PERCENT: 1.8 %
Lab: NORMAL
MAGNESIUM: 2.3 MG/DL (ref 1.7–2.4)
MCH RBC QN AUTO: 31.8 PG (ref 27–31.3)
MCHC RBC AUTO-ENTMCNC: 33.7 % (ref 33–37)
MCV RBC AUTO: 94.4 FL (ref 79–92.2)
METHADONE SCREEN, URINE: NORMAL
MONOCYTES ABSOLUTE: 0.6 K/UL (ref 0.2–0.8)
MONOCYTES RELATIVE PERCENT: 6.3 %
MRSA, DNA, NASAL: NEGATIVE
MYCOPLASMA PNEUMONIAE BY PCR: NOT DETECTED
NEISSERIA MENINGITIDIS BY PCR: NOT DETECTED
NEUTROPHILS ABSOLUTE: 8 K/UL (ref 1.4–6.5)
NEUTROPHILS RELATIVE PERCENT: 91.8 %
OPIATE SCREEN URINE: NORMAL
OXYCODONE URINE: NORMAL
PARAINFLUENZA VIRUS 1 BY PCR: NOT DETECTED
PARAINFLUENZA VIRUS 2 BY PCR: NOT DETECTED
PARAINFLUENZA VIRUS 3 BY PCR: NOT DETECTED
PARAINFLUENZA VIRUS 4 BY PCR: NOT DETECTED
PDW BLD-RTO: 14.2 % (ref 11.5–14.5)
PERFORMED ON: ABNORMAL
PHENCYCLIDINE SCREEN URINE: NORMAL
PLATELET # BLD: 99 K/UL (ref 130–400)
POC CREATININE: 2.9 MG/DL (ref 0.8–1.3)
POC SAMPLE TYPE: ABNORMAL
POTASSIUM SERPL-SCNC: 3 MEQ/L (ref 3.4–4.9)
PROPOXYPHENE SCREEN: NORMAL
PROTEUS SPECIES BY PCR: NOT DETECTED
PSEUDOMONAS AERUGINOSA BY PCR: NOT DETECTED
RBC # BLD: 3.65 M/UL (ref 4.7–6.1)
RESPIRATORY SYNCYTIAL VIRUS BY PCR: NOT DETECTED
SALMONELLA SPECIES BY PCR: NOT DETECTED
SARS-COV-2, PCR: NOT DETECTED
SERRATIA MARCESCENS BY PCR: NOT DETECTED
SODIUM BLD-SCNC: 150 MEQ/L (ref 135–144)
SPECIMEN DESCRIPTION: NORMAL
STAPHYLOCOCCUS AUREUS BY PCR: NOT DETECTED
STAPHYLOCOCCUS EPIDERMIDIS BY PCR: NOT DETECTED
STAPHYLOCOCCUS LUGDUNENSIS BY PCR: NOT DETECTED
STAPHYLOCOCCUS SPECIES BY PCR: NOT DETECTED
STENOTROPHOMONAS MALTOPHILIA BY PCR: NOT DETECTED
STREPTOCOCCUS AGALACTIAE BY PCR: NOT DETECTED
STREPTOCOCCUS PNEUMONIAE BY PCR: NOT DETECTED
STREPTOCOCCUS PYOGENES  BY PCR: NOT DETECTED
STREPTOCOCCUS SPECIES BY PCR: NOT DETECTED
TOTAL CK: 1413 U/L (ref 0–190)
TOTAL CK: 1850 U/L (ref 0–190)
TOTAL CK: 2421 U/L (ref 0–190)
TOTAL PROTEIN: 4.8 G/DL (ref 6.3–8)
TROPONIN: 0.06 NG/ML (ref 0–0.01)
TROPONIN: 0.06 NG/ML (ref 0–0.01)
WBC # BLD: 8.7 K/UL (ref 4.8–10.8)

## 2023-03-11 PROCEDURE — 2500000003 HC RX 250 WO HCPCS: Performed by: INTERNAL MEDICINE

## 2023-03-11 PROCEDURE — C9113 INJ PANTOPRAZOLE SODIUM, VIA: HCPCS | Performed by: INTERNAL MEDICINE

## 2023-03-11 PROCEDURE — 6360000002 HC RX W HCPCS: Performed by: INTERNAL MEDICINE

## 2023-03-11 PROCEDURE — 36591 DRAW BLOOD OFF VENOUS DEVICE: CPT

## 2023-03-11 PROCEDURE — 0HBRXZZ EXCISION OF TOE NAIL, EXTERNAL APPROACH: ICD-10-PCS | Performed by: PODIATRIST

## 2023-03-11 PROCEDURE — 82247 BILIRUBIN TOTAL: CPT

## 2023-03-11 PROCEDURE — 80053 COMPREHEN METABOLIC PANEL: CPT

## 2023-03-11 PROCEDURE — 2580000003 HC RX 258: Performed by: INTERNAL MEDICINE

## 2023-03-11 PROCEDURE — 93005 ELECTROCARDIOGRAM TRACING: CPT | Performed by: NURSE PRACTITIONER

## 2023-03-11 PROCEDURE — 83605 ASSAY OF LACTIC ACID: CPT

## 2023-03-11 PROCEDURE — 87641 MR-STAPH DNA AMP PROBE: CPT

## 2023-03-11 PROCEDURE — 83735 ASSAY OF MAGNESIUM: CPT

## 2023-03-11 PROCEDURE — 2580000003 HC RX 258: Performed by: NURSE PRACTITIONER

## 2023-03-11 PROCEDURE — 82550 ASSAY OF CK (CPK): CPT

## 2023-03-11 PROCEDURE — 36415 COLL VENOUS BLD VENIPUNCTURE: CPT

## 2023-03-11 PROCEDURE — 2000000000 HC ICU R&B

## 2023-03-11 PROCEDURE — 80307 DRUG TEST PRSMV CHEM ANLYZR: CPT

## 2023-03-11 PROCEDURE — 87077 CULTURE AEROBIC IDENTIFY: CPT

## 2023-03-11 PROCEDURE — 2700000000 HC OXYGEN THERAPY PER DAY

## 2023-03-11 PROCEDURE — 99291 CRITICAL CARE FIRST HOUR: CPT | Performed by: INTERNAL MEDICINE

## 2023-03-11 PROCEDURE — A4216 STERILE WATER/SALINE, 10 ML: HCPCS | Performed by: INTERNAL MEDICINE

## 2023-03-11 PROCEDURE — 84484 ASSAY OF TROPONIN QUANT: CPT

## 2023-03-11 PROCEDURE — 85025 COMPLETE CBC W/AUTO DIFF WBC: CPT

## 2023-03-11 PROCEDURE — 0202U NFCT DS 22 TRGT SARS-COV-2: CPT

## 2023-03-11 PROCEDURE — 82248 BILIRUBIN DIRECT: CPT

## 2023-03-11 PROCEDURE — 6360000002 HC RX W HCPCS: Performed by: NURSE PRACTITIONER

## 2023-03-11 RX ORDER — ACETAMINOPHEN 650 MG/1
650 SUPPOSITORY RECTAL EVERY 6 HOURS PRN
Status: DISCONTINUED | OUTPATIENT
Start: 2023-03-11 | End: 2023-03-15 | Stop reason: HOSPADM

## 2023-03-11 RX ORDER — ONDANSETRON 2 MG/ML
4 INJECTION INTRAMUSCULAR; INTRAVENOUS EVERY 6 HOURS PRN
Status: DISCONTINUED | OUTPATIENT
Start: 2023-03-11 | End: 2023-03-15 | Stop reason: HOSPADM

## 2023-03-11 RX ORDER — SODIUM CHLORIDE 0.9 % (FLUSH) 0.9 %
5-40 SYRINGE (ML) INJECTION EVERY 12 HOURS SCHEDULED
Status: DISCONTINUED | OUTPATIENT
Start: 2023-03-11 | End: 2023-03-15 | Stop reason: HOSPADM

## 2023-03-11 RX ORDER — DEXTROSE AND SODIUM CHLORIDE 5; .45 G/100ML; G/100ML
INJECTION, SOLUTION INTRAVENOUS CONTINUOUS
Status: DISCONTINUED | OUTPATIENT
Start: 2023-03-11 | End: 2023-03-12

## 2023-03-11 RX ORDER — SODIUM CHLORIDE 0.9 % (FLUSH) 0.9 %
5-40 SYRINGE (ML) INJECTION PRN
Status: DISCONTINUED | OUTPATIENT
Start: 2023-03-11 | End: 2023-03-15 | Stop reason: HOSPADM

## 2023-03-11 RX ORDER — ACETAMINOPHEN 325 MG/1
650 TABLET ORAL EVERY 6 HOURS PRN
Status: DISCONTINUED | OUTPATIENT
Start: 2023-03-11 | End: 2023-03-15 | Stop reason: HOSPADM

## 2023-03-11 RX ORDER — FENTANYL CITRATE 0.05 MG/ML
25 INJECTION, SOLUTION INTRAMUSCULAR; INTRAVENOUS
Status: DISCONTINUED | OUTPATIENT
Start: 2023-03-11 | End: 2023-03-12 | Stop reason: HOSPADM

## 2023-03-11 RX ORDER — ONDANSETRON 4 MG/1
4 TABLET, ORALLY DISINTEGRATING ORAL EVERY 8 HOURS PRN
Status: DISCONTINUED | OUTPATIENT
Start: 2023-03-11 | End: 2023-03-15 | Stop reason: HOSPADM

## 2023-03-11 RX ORDER — SODIUM CHLORIDE 9 MG/ML
INJECTION, SOLUTION INTRAVENOUS PRN
Status: DISCONTINUED | OUTPATIENT
Start: 2023-03-11 | End: 2023-03-15 | Stop reason: HOSPADM

## 2023-03-11 RX ORDER — SODIUM CHLORIDE 9 MG/ML
INJECTION, SOLUTION INTRAVENOUS CONTINUOUS
Status: DISCONTINUED | OUTPATIENT
Start: 2023-03-11 | End: 2023-03-11

## 2023-03-11 RX ORDER — AMMONIUM LACTATE 12 G/100G
LOTION TOPICAL DAILY
Status: DISCONTINUED | OUTPATIENT
Start: 2023-03-11 | End: 2023-03-15 | Stop reason: HOSPADM

## 2023-03-11 RX ORDER — POLYETHYLENE GLYCOL 3350 17 G/17G
17 POWDER, FOR SOLUTION ORAL DAILY PRN
Status: DISCONTINUED | OUTPATIENT
Start: 2023-03-11 | End: 2023-03-15 | Stop reason: HOSPADM

## 2023-03-11 RX ORDER — POTASSIUM CHLORIDE 29.8 MG/ML
20 INJECTION INTRAVENOUS PRN
Status: DISCONTINUED | OUTPATIENT
Start: 2023-03-11 | End: 2023-03-12

## 2023-03-11 RX ORDER — FENTANYL CITRATE 0.05 MG/ML
50 INJECTION, SOLUTION INTRAMUSCULAR; INTRAVENOUS
Status: DISCONTINUED | OUTPATIENT
Start: 2023-03-11 | End: 2023-03-11

## 2023-03-11 RX ADMIN — POTASSIUM CHLORIDE 20 MEQ: 29.8 INJECTION, SOLUTION INTRAVENOUS at 10:50

## 2023-03-11 RX ADMIN — POTASSIUM CHLORIDE 20 MEQ: 29.8 INJECTION, SOLUTION INTRAVENOUS at 09:27

## 2023-03-11 RX ADMIN — SODIUM CHLORIDE, PRESERVATIVE FREE 40 MG: 5 INJECTION INTRAVENOUS at 09:23

## 2023-03-11 RX ADMIN — PIPERACILLIN AND TAZOBACTAM 3375 MG: 3; .375 INJECTION, POWDER, FOR SOLUTION INTRAVENOUS at 21:50

## 2023-03-11 RX ADMIN — DEXTROSE AND SODIUM CHLORIDE: 5; 450 INJECTION, SOLUTION INTRAVENOUS at 09:22

## 2023-03-11 RX ADMIN — VANCOMYCIN HYDROCHLORIDE 1750 MG: 1 INJECTION, POWDER, LYOPHILIZED, FOR SOLUTION INTRAVENOUS at 03:34

## 2023-03-11 RX ADMIN — FENTANYL CITRATE 25 MCG: 0.05 INJECTION, SOLUTION INTRAMUSCULAR; INTRAVENOUS at 14:15

## 2023-03-11 RX ADMIN — SODIUM CHLORIDE: 9 INJECTION, SOLUTION INTRAVENOUS at 05:59

## 2023-03-11 RX ADMIN — DEXMEDETOMIDINE 0.2 MCG/KG/HR: 100 INJECTION, SOLUTION INTRAVENOUS at 18:38

## 2023-03-11 RX ADMIN — SODIUM CHLORIDE 3000 MG: 900 INJECTION INTRAVENOUS at 01:36

## 2023-03-11 RX ADMIN — FENTANYL CITRATE 25 MCG: 0.05 INJECTION, SOLUTION INTRAMUSCULAR; INTRAVENOUS at 10:55

## 2023-03-11 RX ADMIN — POTASSIUM CHLORIDE 20 MEQ: 29.8 INJECTION, SOLUTION INTRAVENOUS at 09:30

## 2023-03-11 RX ADMIN — SODIUM CHLORIDE, PRESERVATIVE FREE 40 MG: 5 INJECTION INTRAVENOUS at 21:40

## 2023-03-11 RX ADMIN — LEVOFLOXACIN 750 MG: 5 INJECTION, SOLUTION INTRAVENOUS at 00:28

## 2023-03-11 RX ADMIN — PIPERACILLIN AND TAZOBACTAM 3375 MG: 3; .375 INJECTION, POWDER, FOR SOLUTION INTRAVENOUS at 12:38

## 2023-03-11 RX ADMIN — SODIUM CHLORIDE, PRESERVATIVE FREE 10 ML: 5 INJECTION INTRAVENOUS at 09:24

## 2023-03-11 RX ADMIN — ENOXAPARIN SODIUM 90 MG: 100 INJECTION SUBCUTANEOUS at 09:24

## 2023-03-11 ASSESSMENT — PAIN SCALES - GENERAL
PAINLEVEL_OUTOF10: 0
PAINLEVEL_OUTOF10: 0
PAINLEVEL_OUTOF10: 5
PAINLEVEL_OUTOF10: 1
PAINLEVEL_OUTOF10: 8
PAINLEVEL_OUTOF10: 0

## 2023-03-11 NOTE — PROGRESS NOTES
[facility]      Date: 3/11/2023  Patient Name: Addy Morocho        MRN: [id number]  Account: [de-identified]   : 1940  (78 y.o.)  Room: [place] [address]    Chart reviewed, attempted OT at 14:55 for eval. Patient not seen 2° to:    Hold OT eval. Pt not medically stable to participate and has podiatry consult. Spoke to Lynda Strong RN aware. Will attempt again when able.     Electronically signed by LAVERNE Victoria on 3/11/2023 at 3:07 PM

## 2023-03-11 NOTE — CONSULTS
Inpatient consult to Critical Care  Consult performed by: Eulogio Alejandro MD  Consult ordered by: Ashlee Burt DO          Admit Date: 3/10/2023    PCP:  No primary care provider on file. CHIEF COMPLAINT / HPI:                The patient is a 78 y.o. male with significant past medical history of chronic kidney disease, hypertension, hyperlipidemia and history of DVT presumed to be on oral anticoagulation who was found by his son trapped behind the bathroom door covered in feces and urine and confused. His son believes he was there for couple days. He was brought to the ER. Was found to be hypotensive and hypothermic. He was started on fluids and given total of 4 L boluses and subsequently Levophed was started. Central line was placed. Blood cultures and urine cultures were sent-patient was started on coverage for antibiotics. Past Medical History:      Diagnosis Date    Ascending aorta dilatation (HCC)     Basal cell carcinoma     CKD (chronic kidney disease)     DVT (deep venous thrombosis) (HCC)         Past Surgical History:    History reviewed. No pertinent surgical history. Current Medications:     sodium chloride flush  5-40 mL IntraVENous 2 times per day    pantoprazole (PROTONIX) 40 mg injection  40 mg IntraVENous Q12H    ampicillin-sulbactam  3,000 mg IntraVENous Q12H    levofloxacin  750 mg IntraVENous Q48H    enoxaparin  1 mg/kg SubCUTAneous Daily    vancomycin (VANCOCIN) intermittent dosing (placeholder)   Other RX Placeholder     Home Meds:  Prior to Admission medications    Medication Sig Start Date End Date Taking?  Authorizing Provider   triamterene-hydroCHLOROthiazide (DYAZIDE) 37.5-25 MG per capsule TAKE 1 CAPSULE BY MOUTH EVERY DAY 11/2/22  Yes Historical Provider, MD   pravastatin (PRAVACHOL) 20 MG tablet TAKE 1 TABLET BY MOUTH EVERYDAY AT BEDTIME 4/28/22  Yes Historical Provider, MD   metoprolol succinate (TOPROL XL) 25 MG extended release tablet Take by mouth daily 1/12/23  Yes Historical Provider, MD   levothyroxine (SYNTHROID) 125 MCG tablet TAKE 1 TABLET BY MOUTH EVERY DAY 22  Yes Historical Provider, MD   cyanocobalamin 1000 MCG/ML injection Inject into the muscle 23 Yes Historical Provider, MD   aspirin 81 MG EC tablet Take by mouth daily 11  Yes Historical Provider, MD   pravastatin (PRAVACHOL) 20 MG tablet TAKE 1 TABLET BY MOUTH EVERYDAY AT BEDTIME 2/10/23   Historical Provider, MD   ELIQUIS 5 MG TABS tablet TAKE 1 TABLET BY MOUTH TWICE A DAY 23   Historical Provider, MD       Allergies:  Patient has no known allergies. Social History:      reports that he has quit smoking. His smoking use included cigarettes. He has been exposed to tobacco smoke. He has never used smokeless tobacco. He reports current alcohol use of about 14.0 standard drinks per week. He reports that he does not use drugs. TOBACCO:   reports that he has quit smoking. His smoking use included cigarettes. He has been exposed to tobacco smoke. He has never used smokeless tobacco.     ETOH:   reports current alcohol use of about 14.0 standard drinks per week. Family History:   Nonpertinent. Review of Systems  Complete review of systems done and negative unless otherwise noted positive. Objective:     PHYSICAL EXAM:      VITALS:  BP (!) 163/135   Pulse 90   Temp (!) 95.6 °F (35.3 °C) (Bladder)   Resp 21   Ht 6' (1.829 m)   Wt 189 lb 6.4 oz (85.9 kg)   SpO2 99%   BMI 25.69 kg/m²   24HR INTAKE/OUTPUT:  No intake or output data in the 24 hours ending 23 0857  CURRENT PULSE OXIMETRY:  SpO2: 99 %  24HR PULSE OXIMETRY RANGE:  SpO2  Av.4 %  Min: 95 %  Max: 100 %    General appearance -ill appearing  Mental status -encephalopathic.   Eyes - pupils equal and reactive   Nose - normal and patent   Neck - supple, no significant adenopathy  Chest -diminished bilaterally to auscultation, no wheezes, rales or rhonchi, symmetric air entry  Heart -tachycardic, regular rhythm, normal S1, S2, no murmurs   Abdomen - soft, nontender, nondistended, bowel sounds are present  Rectal - deferred, not clinically indicated  Neurological -encephalopathic, does not follow commands but moves extremities. Musculoskeletal - no joint tenderness, deformity or swelling  Extremities - peripheral pulses normal, no pedal edema, no clubbing or cyanosis  Skin -multiple pressure ulcers on coccyx and back             DATA:    CBC:   Recent Labs     03/10/23  1430 03/10/23  2002 03/11/23  0345   WBC 12.9*  --  8.7   HGB 13.6* 10.3* 11.6*   HCT 41.0*  --  34.5*   *  --  99*     BMP:    Recent Labs     03/10/23  1430 03/10/23  1509 03/10/23  2002 03/11/23  0345   *  --   --  150*   K 3.6  --   --  3.0*     --   --  114*   CO2 19*  --   --  19*   BUN 92*  --   --  92*   CREATININE 2.63*  --  2.8* 2.90*   GLUCOSE 149* 116  --  112*   CALCIUM 8.8  --   --  7.6*   MG 3.0*  --   --  2.3     HEPATIC:   Recent Labs     03/10/23  1430 03/11/23  0037 03/11/23  0345   *  --  146*   *  --  291*   BILITOT 2.3* 2.0* 1.9*   ALKPHOS 132*  --  99     LACTATE:   Recent Labs     03/10/23  1430 03/11/23  0654   LACTA 4.4* 1.5     PROCALCITONIN:   Recent Labs     03/10/23  1430   PROCAL 1.90*     TROPONIN:   Recent Labs     03/10/23  1430 03/11/23  0345   TROPONINI 0.020* 0.056*     CK:   Recent Labs     03/10/23  1430 03/11/23  0704   CKTOTAL 2,753* 2,421*     BNP: No results for input(s): BNP in the last 72 hours. LIPIDS: No results for input(s): CHOL, TRIG, HDL, LDLCHOLESTEROL in the last 72 hours. INR:   Recent Labs     03/10/23  1430   INR 1.2     BLOOD GAS: No results for input(s): PH, PCO2, PO2, HCO3, O2SAT in the last 72 hours. UA:  Recent Labs     03/10/23  1430   COLORU DARK YELLOW*   NITRU Negative   LEUKOCYTESUR TRACE*   GLUCOSEU Negative   KETUA 15*   RBCUA 10-20*   WBCUA 10-20*   BACTERIA Negative     TSH: No results for input(s): TSH in the last 72 hours.     Cultures:  No results for input(s): Madelin Moore in the last 72 hours. Recent Labs     03/10/23  1539   BC Gram stain anaerobic bottle  Gram negative rods  1 out of 2 blood cultures  Further results to follow  Further testing performed at [place] [address]  *           Radiology Review:    CXR portable: Results for orders placed during the hospital encounter of 03/10/23    XR CHEST PORTABLE    Narrative  EXAMINATION:  ONE XRAY VIEW OF THE CHEST    3/10/2023 7:13 pm    COMPARISON:  None. HISTORY:  ORDERING SYSTEM PROVIDED HISTORY: central line  TECHNOLOGIST PROVIDED HISTORY:  Reason for exam:->central line  What reading provider will be dictating this exam?->CRC    FINDINGS:  The lungs are without acute focal process. There is no effusion or  pneumothorax. The cardiomediastinal silhouette is without acute process. The  osseous structures are without acute process. Right internal jugular line  tip in the proximal SVC. Impression  Right internal jugular line tip in the proximal SVC. No pneumothorax. Echo:    Assessment/Plan         Impression:    -Septic shock. Likely courses bacteremia. -Gram-negative pamela bacteremia. Suspect course is urine or gout. -Toxic metabolic encephalopathy.  -Acute renal failure due to shock.  -Hypernatremia.  -Severe hypokalemia. -Metabolic  acidosis. -Elevated LFTs. Recommendations:    -Admitted to the ICU for hemodynamic and airway monitoring.  -Continue vasopressors. Goal map above 65. Given 4 L of fluid.  -Strict intake and output measurement. Watch kidney function closely. -BMP every 12 hours. -Broad-spectrum antibiotics pending cultures. -Replace potassium.  -Status post control.  -Trend LFTs. -DVT and GI prophylaxis. Patient is DNR CCA with no intubation.           DNR-CCA    Excluding procedures, the total critical care time caring for this patient with life threatening, unstable organ failure, including direct patient contact, review of medical record, management of life support systems, review of data including imaging and labs, discussions with other team members, patient's family and physicians at least 32 minutes so far today.      Electronically signed by Hiral Tesfaye MD on 3/11/2023 at 8:57 AM

## 2023-03-11 NOTE — PROGRESS NOTES
Spiritual Care Services     Summary of Visit:  Pt appears to be uncomfortable, face drawn. Nurse present and attentive, had just given pain medication. Pt's son Lillian Romero at the bedside. Support provided. Pt's pedro tradition is [address] His son unaware if his pedro is important to him. For many people pedro is a private matter. No AD. Encounter Summary  Encounter Overview/Reason : Initial Encounter  Service Provided For[de-identified] Patient and family together  Referral/Consult From[de-identified] Rounding  Support System: Children  Complexity of Encounter: Moderate  Begin Time: 1130  End Time : 1145  Total Time Calculated: 15 min  Encounter   Type: Initial Screen/Assessment                            Spiritual Assessment/Intervention/Outcomes:    Assessment: Calm, Concerns with suffering    Intervention: Active listening, Discussed illness injury and its impact, Discussed meaning/purpose    Outcome: Comfort      Care Plan:    Plan and Referrals  Plan/Referrals: Continue Support (comment)          Spiritual Care Services   Electronically signed by Gildardo Bailey [facility] on 3/11/2023 at 12:51 PM.    To reach a  for emotional and spiritual support, place [facility].   If a  is needed immediately, dial 0 and ask to page the on-call .

## 2023-03-11 NOTE — FLOWSHEET NOTE
Patient not direct-able, removing lines, taking off monitors, attempting to remove garcia, will reach out to physician.

## 2023-03-11 NOTE — ED NOTES
Report given to ICU nurse at ED bed 4 bedside. All questions answered at this time.       Clarence Ramos RN  03/10/23 5040

## 2023-03-11 NOTE — PROGRESS NOTES
[address] [facility] Pharmacokinetic Monitoring Service - Vancomycin     Dee Dee Wu is a 78 y.o. male starting on vancomycin therapy for sepsis of unknown etiology. Pharmacy consulted by [facility] APRN-CNP for monitoring and adjustment. Target Concentration: Dosing based on anticipated concentration <15 mg/L due to renal impairment/insufficiency    Additional Antimicrobials: Unasyn, Levaquin    Pertinent Laboratory Values: Wt Readings from Last 1 Encounters:   03/10/23 190 lb (86.2 kg)     Temp Readings from Last 1 Encounters:   03/10/23 (!) 95.2 °F (35.1 °C) (Bladder)     Estimated Creatinine Clearance: 21 mL/min (A) (based on SCr of 2.8 mg/dL (H)). Recent Labs     03/10/23  1430 03/10/23  2002   CREATININE 2.63* 2.8*   WBC 12.9*  --      Procalcitonin:   Recent Labs     03/10/23  1430   PROCAL 1.90*         Pertinent Cultures:  Culture Date Source Results   3/10/23 blood pending   MRSA Nasal Swab: not ordered. Order placed by pharmacy.     Plan:  Concentration-guided dosing due to renal impairment/insufficiency  Start vancomycin 1750mg (20 mg/kg) loading dose x 1  Renal labs as indicated   Vancomycin concentration ordered for 3/12 @ 0000, about 24 hours post dose   Pharmacy will continue to monitor patient and adjust therapy as indicated    Thank you for the consult,  Doreen Sampson [facility]  3/10/2023 11:05 PM

## 2023-03-11 NOTE — FLOWSHEET NOTE
Patient very agitated, pulling at lines, taking off gown, pulling off tele, not direct-able. Dr. Yuridia porter served.

## 2023-03-11 NOTE — PROGRESS NOTES
Progress Note  Date:3/11/2023       Room:[place] [address]-01  Patient Name:Keegan Godoy     YOB: 1940     Age:83 y.o.        Subjective      Admitted by overnight hospitalist team  Found down at home by family, possibly on floor as long as 4 days.  Remains hypothermic and delirious overnight.  Sodium slightly worsened at 150  Potassium borderline low at 3.0 down from 3.6 on admission.  Renal function slightly worsened, creatinine 2.9 up from 2.63.  Leukocytosis improved, WBCs 8.7 this morning.  Hemoglobin 11.6 down from 13.6 on admission, possibly dilutional in setting of volume resuscitation.  CK trending improvement, 1850 down from >2700 on presentation.  Mild continuous troponin worsening, most recent value 0.06.  Lactic acidosis resolved, lactate 1.5 on morning labs down from 4.4 on admission.      Objective         Vitals Last 24 Hours:  TEMPERATURE:  Temp  Av.8 °F (33.2 °C)  Min: 87.6 °F (30.9 °C)  Max: 95.6 °F (35.3 °C)  RESPIRATIONS RANGE: Resp  Av.1  Min: 18  Max: 34  PULSE OXIMETRY RANGE: SpO2  Av.3 %  Min: 95 %  Max: 100 %  PULSE RANGE: Pulse  Av.3  Min: 59  Max: 107  BLOOD PRESSURE RANGE: Systolic (24hrs), Av , Min:66 , Max:162   ; Diastolic (24hrs), Av, Min:26, Max:139    I/O (24Hr):  No intake or output data in the 24 hours ending 23 0744    Constitutional: Delirious hypothermic critically ill adult male reclining in ICU bed appearing restless and confused but in no acute physical distress, with warming blanket in place.  Head: NCAT  Eyes: PERRLA, EOMI  ENT: Hearing appears grossly intact.  Some blood collecting on anterior lips, without active bleeding appreciated.  Neck: Supple.  Trachea midline, slurred and unintelligible  Cardiovascular: RRR.  No significant murmurs appreciated.   No significant pretibial edema.  Pulmonary: Normal rate and effort of respiration on O2 by NC.  Grossly CTAB.  No coughing.    Abdomen: Soft, nontense, nondistended.  Bowel sounds  appreciated. Nontender to palpation. Neurologic: Awake, disoriented. Generalized weakness. Moves head and arms. Doesn't follow most commands. Dysarthric speech. Psychiatric: Restless, disoriented. MSK/integumentary: Scattered small ecchymoses on extensor surfaces of forearms, on left inframammary rib angle, and on posterior left shoulder. No gross bony abnormalities appreciated. Labs/Imaging/Diagnostics    Labs:  CBC:  Recent Labs     03/10/23  1430 03/10/23  2002 03/11/23  0345   WBC 12.9*  --  8.7   RBC 4.23*  --  3.65*   HGB 13.6* 10.3* 11.6*   HCT 41.0*  --  34.5*   MCV 96.9*  --  94.4*   RDW 14.3  --  14.2   *  --  99*     CHEMISTRIES:  Recent Labs     03/10/23  1430 03/10/23  1509 03/10/23  2002 03/11/23  0345   *  --   --  150*   K 3.6  --   --  3.0*     --   --  114*   CO2 19*  --   --  19*   BUN 92*  --   --  92*   CREATININE 2.63*  --  2.8* 2.90*   GLUCOSE 149* 116  --  112*   MG 3.0*  --   --  2.3     PT/INR:  Recent Labs     03/10/23  1430   PROTIME 15.1*   INR 1.2     APTT:  Recent Labs     03/10/23  1430   APTT 28.2     LIVER PROFILE:  Recent Labs     03/10/23  1430 03/11/23  0037 03/11/23  0345   *  --  146*   *  --  291*   BILIDIR  --  1.3*  --    BILITOT 2.3* 2.0* 1.9*   ALKPHOS 132*  --  99       Imaging Last 24 Hours:  CT ABDOMEN PELVIS WO CONTRAST Additional Contrast? None    Result Date: 3/10/2023  EXAMINATION: CT OF THE ABDOMEN AND PELVIS WITHOUT CONTRAST 3/10/2023 4:54 pm TECHNIQUE: CT of the abdomen and pelvis was performed without the administration of intravenous contrast. Multiplanar reformatted images are provided for review. Automated exposure control, iterative reconstruction, and/or weight based adjustment of the mA/kV was utilized to reduce the radiation dose to as low as reasonably achievable. COMPARISON: None.  HISTORY: ORDERING SYSTEM PROVIDED HISTORY: fall TECHNOLOGIST PROVIDED HISTORY: Reason for exam:->fall Additional Contrast?->None Decision Support Exception - unselect if not a suspected or confirmed emergency medical condition->Emergency Medical Condition (MA) What reading provider will be dictating this exam?->CRC FINDINGS: Lower Chest: Left lower lobe opacity consistent with atelectasis although pneumonia not totally excludable. Organs: The liver, spleen, adrenal glands, pancreas are normal.  Gallbladder is mildly distended with gallbladder sludge.  Right renal atrophy. GI/Bowel: Diffuse colonic fecal retention.  Normal small bowel and appendix. Pelvis: Prostatomegaly.  Arce catheter in a decompressed urinary bladder. Peritoneum/Retroperitoneum: No free fluid or free air. Bones/Soft Tissues:   Right inguinal hernia containing normal loops of bowel. Degenerative changes lumbar spine.  No fractures.     Atraumatic appearance of the abdomen and pelvis. Prostatomegaly. Right renal atrophy.     XR FEMUR RIGHT (MIN 2 VIEWS)    Result Date: 3/10/2023  EXAMINATION: XRAY VIEWS OF THE RIGHT FEMUR 3/10/2023 5:08 pm COMPARISON: None. HISTORY: ORDERING SYSTEM PROVIDED HISTORY: fall TECHNOLOGIST PROVIDED HISTORY: Reason for exam:->fall What reading provider will be dictating this exam?->CRC FINDINGS: There is no evidence of acute fracture.  There is normal alignment.  No acute joint abnormality.  No focal osseous lesion. No focal soft tissue abnormality.     No acute osseous abnormality.     CT Head W/O Contrast    Result Date: 3/10/2023  EXAMINATION: CT OF THE HEAD WITHOUT CONTRAST; CT OF THE CERVICAL SPINE WITHOUT CONTRAST 3/10/2023 4:54 pm TECHNIQUE: CT of the head was performed without the administration of intravenous contrast. Automated exposure control, iterative reconstruction, and/or weight based adjustment of the mA/kV was utilized to reduce the radiation dose to as low as reasonably achievable.; CT of the cervical spine was performed without the administration of intravenous contrast. Multiplanar reformatted images are  provided for review. Automated exposure control, iterative reconstruction, and/or weight based adjustment of the mA/kV was utilized to reduce the radiation dose to as low as reasonably achievable. COMPARISON: None. HISTORY: ORDERING SYSTEM PROVIDED HISTORY: fall, confusion TECHNOLOGIST PROVIDED HISTORY: Reason for exam:->fall, confusion Has a \"code stroke\" or \"stroke alert\" been called? ->No Decision Support Exception - unselect if not a suspected or confirmed emergency medical condition->Emergency Medical Condition (MA) What reading provider will be dictating this exam?->CRC FINDINGS: CT OF THE BRAIN: BRAIN/VENTRICLES: No mass effect, edema or hemorrhage is seen. Mild volume loss is seen in the cerebrum with mild chronic microvascular ischemic changes. No hydrocephalus or extra-axial fluid is seen. ORBITS: Prosthetic lenses are seen in the globes bilaterally. The orbits are otherwise grossly unremarkable. The the SINUSES: Mild mucosal thickening is seen in the paranasal sinuses. Small effusion in the right mastoid air cells. The left mastoid air cells are clear. SOFT TISSUES/SKULL: No acute abnormality of the visualized skull or soft tissues. CT CERVICAL SPINE: BONES/ALIGNMENT: No fracture or joint dislocation is seen in the cervical spine. Mild age indeterminate compression fracture deformity along the superior endplate of the T1 vertebral body. DEGENERATIVE CHANGES: Moderate loss of disc heights at C5-6 and C6-7. Mild central canal scratch the small disc bulge results in mild central canal stenosis at C3-4. Multilevel facet and uncovertebral joint hypertrophic changes resulting in variable degrees of neural foraminal stenoses, worst (moderate to severe) at the left C3-4 level. SOFT TISSUES: There is no prevertebral soft tissue swelling. CT HEAD WITHOUT CONTRAST: 1. No skull fracture or acute intracranial abnormality. CT CERVICAL SPINE WITHOUT CONTRAST: 1.   No fracture or joint dislocation is seen in the cervical spine. 2. Mild age indeterminate compression fracture deformity in the T1 vertebral body. If indicated, MRI may be obtained for further evaluation. 3. Degenerative changes, as described. CT CHEST WO CONTRAST    Result Date: 3/10/2023  EXAMINATION: CT OF THE CHEST WITHOUT CONTRAST 3/10/2023 4:54 pm TECHNIQUE: CT of the chest was performed without the administration of intravenous contrast. Multiplanar reformatted images are provided for review. Automated exposure control, iterative reconstruction, and/or weight based adjustment of the mA/kV was utilized to reduce the radiation dose to as low as reasonably achievable. COMPARISON: None. HISTORY: ORDERING SYSTEM PROVIDED HISTORY: fall TECHNOLOGIST PROVIDED HISTORY: Reason for exam:->fall Decision Support Exception - unselect if not a suspected or confirmed emergency medical condition->Emergency Medical Condition (MA) What reading provider will be dictating this exam?->CRC FINDINGS: Mediastinum: Thoracic aorta is unremarkable. Heart and pericardium are normal.  Calcified plaque involving the coronary arteries. No significant mediastinal adenopathy. Lungs/pleura: Patchy right upper lobe lung opacities consistent with pneumonia. Bibasilar atelectasis. Upper Abdomen:   Gallbladder sludge. Soft Tissues/Bones: Fractures. Atraumatic appearance of the chest. Patchy right upper lobe opacities consistent with pneumonia.      CT CERVICAL SPINE WO CONTRAST    Result Date: 3/10/2023  EXAMINATION: CT OF THE HEAD WITHOUT CONTRAST; CT OF THE CERVICAL SPINE WITHOUT CONTRAST 3/10/2023 4:54 pm TECHNIQUE: CT of the head was performed without the administration of intravenous contrast. Automated exposure control, iterative reconstruction, and/or weight based adjustment of the mA/kV was utilized to reduce the radiation dose to as low as reasonably achievable.; CT of the cervical spine was performed without the administration of intravenous contrast. Multiplanar reformatted images are provided for review. Automated exposure control, iterative reconstruction, and/or weight based adjustment of the mA/kV was utilized to reduce the radiation dose to as low as reasonably achievable. COMPARISON: None. HISTORY: ORDERING SYSTEM PROVIDED HISTORY: fall, confusion TECHNOLOGIST PROVIDED HISTORY: Reason for exam:->fall, confusion Has a \"code stroke\" or \"stroke alert\" been called? ->No Decision Support Exception - unselect if not a suspected or confirmed emergency medical condition->Emergency Medical Condition (MA) What reading provider will be dictating this exam?->CRC FINDINGS: CT OF THE BRAIN: BRAIN/VENTRICLES: No mass effect, edema or hemorrhage is seen. Mild volume loss is seen in the cerebrum with mild chronic microvascular ischemic changes. No hydrocephalus or extra-axial fluid is seen. ORBITS: Prosthetic lenses are seen in the globes bilaterally. The orbits are otherwise grossly unremarkable. The the SINUSES: Mild mucosal thickening is seen in the paranasal sinuses. Small effusion in the right mastoid air cells. The left mastoid air cells are clear. SOFT TISSUES/SKULL: No acute abnormality of the visualized skull or soft tissues. CT CERVICAL SPINE: BONES/ALIGNMENT: No fracture or joint dislocation is seen in the cervical spine. Mild age indeterminate compression fracture deformity along the superior endplate of the T1 vertebral body. DEGENERATIVE CHANGES: Moderate loss of disc heights at C5-6 and C6-7. Mild central canal scratch the small disc bulge results in mild central canal stenosis at C3-4. Multilevel facet and uncovertebral joint hypertrophic changes resulting in variable degrees of neural foraminal stenoses, worst (moderate to severe) at the left C3-4 level. SOFT TISSUES: There is no prevertebral soft tissue swelling. CT HEAD WITHOUT CONTRAST: 1. No skull fracture or acute intracranial abnormality. CT CERVICAL SPINE WITHOUT CONTRAST: 1.   No fracture or joint dislocation is seen in the cervical spine. 2. Mild age indeterminate compression fracture deformity in the T1 vertebral body. If indicated, MRI may be obtained for further evaluation. 3. Degenerative changes, as described. CT THORACIC SPINE WO CONTRAST    Result Date: 3/10/2023  EXAMINATION: CT OF THE THORACIC SPINE WITHOUT CONTRAST  3/10/2023 4:54 pm: TECHNIQUE: CT of the thoracic spine was performed without the administration of intravenous contrast. Multiplanar reformatted images are provided for review. Automated exposure control, iterative reconstruction, and/or weight based adjustment of the mA/kV was utilized to reduce the radiation dose to as low as reasonably achievable. COMPARISON: None. HISTORY: ORDERING SYSTEM PROVIDED HISTORY: fall confusion TECHNOLOGIST PROVIDED HISTORY: Reason for exam:->fall confusion What reading provider will be dictating this exam?->CRC FINDINGS: BONES/ALIGNMENT: There is normal alignment of the spine. The vertebral body heights are maintained. No osseous destructive lesion is seen. DEGENERATIVE CHANGES: Multilevel degenerative changes. SOFT TISSUES: No paraspinal mass is seen. MISCELLANEOUS: Bilateral pulmonary opacities. No evidence of acute thoracic spine trauma. CT LUMBAR SPINE WO CONTRAST    Result Date: 3/10/2023  EXAMINATION: CT OF THE LUMBAR SPINE WITHOUT CONTRAST  3/10/2023 TECHNIQUE: CT of the lumbar spine was performed without the administration of intravenous contrast. Multiplanar reformatted images are provided for review. Adjustment of mA and/or kV according to patient size was utilized. Automated exposure control, iterative reconstruction, and/or weight based adjustment of the mA/kV was utilized to reduce the radiation dose to as low as reasonably achievable.  COMPARISON: None HISTORY: ORDERING SYSTEM PROVIDED HISTORY: fall confusion TECHNOLOGIST PROVIDED HISTORY: Reason for exam:->fall confusion Decision Support Exception - unselect if not a suspected or confirmed emergency medical condition->Emergency Medical Condition (MA) What reading provider will be dictating this exam?->CRC FINDINGS: BONES/ALIGNMENT: There is normal alignment of the spine. The vertebral body heights are maintained. No osseous destructive lesion is seen. DEGENERATIVE CHANGES: Multilevel degenerative changes there is is SOFT TISSUES/RETROPERITONEUM: No paraspinal mass is seen. No evidence of acute lumbar spine trauma. Multilevel degenerative changes. XR CHEST PORTABLE    Result Date: 3/10/2023  EXAMINATION: ONE XRAY VIEW OF THE CHEST 3/10/2023 7:13 pm COMPARISON: None. HISTORY: ORDERING SYSTEM PROVIDED HISTORY: central line TECHNOLOGIST PROVIDED HISTORY: Reason for exam:->central line What reading provider will be dictating this exam?->CRC FINDINGS: The lungs are without acute focal process. There is no effusion or pneumothorax. The cardiomediastinal silhouette is without acute process. The osseous structures are without acute process. Right internal jugular line tip in the proximal SVC. Right internal jugular line tip in the proximal SVC. No pneumothorax. XR HIP 2-3 VW W PELVIS RIGHT    Result Date: 3/10/2023  EXAMINATION: ONE XRAY VIEW OF THE PELVIS AND TWO XRAY VIEWS RIGHT HIP 3/10/2023 5:08 pm COMPARISON: None. HISTORY: ORDERING SYSTEM PROVIDED HISTORY: fall TECHNOLOGIST PROVIDED HISTORY: Reason for exam:->fall What reading provider will be dictating this exam?->CRC FINDINGS: The bones are demineralized. No evidence of pelvic fracture. Bilateral hips demonstrate normal alignment. No focal osseous lesion. SI joints are symmetric. Arce catheter in place. No acute abnormality of the pelvis and right hip. Assessment//Plan           Hospital Problems             Last Modified POA    * (Principal) Shock ([address] 3/10/2023 Yes     Assessment & Plan    Shock: Hypotension and hypothermic after possible fall at home. Temp 88.5 and BP 78/47 on arrival to ED.   Dehydration and LEORA with elevated CK, and elevated liver enzymes. Lactic acid elevated patient given 4 L NS in ED. Altered mental status. WBC 12.9. Procalcitonin 1.90. Patient placed on Patrick hugger. Patient requiring pressors. Central line placed in ED. UA negative. Blood culture sent x2. We will monitor closely in ICU setting. We will continue pressors and fluid resuscitation. We will continue warming with Patrick hugger and monitor closely. Sepsis of unknown etiology chest x-ray suggestive of possible pneumonia versus aspiration pneumonia. We will get respiratory panel. We will empirically treat now with Unasyn, vancomycin, and Levaquin. 3/11: Antibiotic coverage narrowed to vancomycin and Zosyn. No focal source of infection identified, shock now suspected to be multifactorial instead of septic, with likely contributing factors of hypovolemia and possibly cardiogenic. Cannot yet definitively rule out septic shock but no obvious source of infection at present. Remains on Levophed at 10 mcg for vasopressor support. Rhabdomyolysis: Total CK 2753. Likely due to fall and being down for as much is 4 days. Patient given 4 L NS in ED. We will continue IV fluid resuscitation. We will monitor CK every 6 hours. 3/11: Creatinine improving, most recent value 1850     LEORA on CKD3: Dehydration. Creatinine 2.63. Baseline around 1.2. Likely due to poor oral intake for extended period of time. Patient given 4 L NS in ED. We will strictly monitor inputs and outputs and continue IV fluid resuscitation. Will monitor for signs of urine retention  3/11: Renal function slightly worse, creatinine 2.90     Elevated troponin: Troponin 0.020. EKG shows sinus without ST elevation. Likely due to LEORA and ischemia due to hypoperfusion. We will cycle troponin and repeat EKG. We will keep patient on telemetry monitoring. If needed, we will consult cardiology. 3/11: Troponins slowly worsening, most recent value 0.060. Unclear if actual cardiac ischemia versus impaired clearance with simultaneous worsening renal function. Continuing to trend. Cardiology consulted. Elevated LFTs: Alk phos 132, , , bilirubin 2.3. Ammonia 45. Likely shock liver. Patient received 4 L NS in ED and placed on pressors. We will repeat CMP now and monitor with CMP daily. We will continue IV fluid resuscitation. If no improvement, we will consult GI     Elevated lactic acid: Lactic acid 4.4. Patient given 4 L NS in ED. Repeat lactic acid 2.0. We will continue to trend lactic acid. 3/11: Lactate elevation resolved with IVF resuscitation and BP normalization of vasopressors. Hypothermia: Temp 88.5. Patient currently on [employer]. Exposure versus sepsis. We will continue to monitor closely with indwelling catheter  3/11: Remains hypothermic, continues on external warming blanket. AMS: Fall at home. Typically A&O x3. Likely due to sepsis shock. Head CT negative. We will continue to monitor. If needed, we will consult neurology. 3/11: Persistent encephalopathy, suspected multifactorial.  Monitoring. Active problems:  History of DVT: Patient on [facility]. Likely has not taken in the last 4 days and cannot take oral medications at this time. We will administer full dose Lovenox daily given renal function. We will resume home meds when normal diet is resumed. HTN: Patient on home meds to control. We will hold home meds until blood pressure stabilized. Patient currently requiring pressors for hypotension. HLD: Patient on statin at home. We will resume with normal diet  Hypothyroidism: Patient on Synthroid. We will resume with normal diet. HF: EF of 58%. Patient on beta-blocker.   We will monitor fluid status closely and resume with normal diet      Electronically signed by Chano Sam DO on 3/11/23 at 7:44 AM EST

## 2023-03-11 NOTE — FLOWSHEET NOTE
Patient resting in bed, very shakey, unable to verbalize needs, repositioning often, multiple wounds, open pressure abrasion to middle upper back between shoulder blades and a medium to xerofoam applied with large mepilex, open area to coccyx deep bruise with areas open also covered with xerofoam and small mepilex. Bear hugger on most of day then removed once temperature reached 97.8. Patient top dentures removed, mouth dry and bloody, gina blood during mouth care. Dr. Sammie Liu aware. Dentures are in denture cup with label soaking at bedside. SCD's and heel boots on bilaterally. Arce care completed with soap and water.

## 2023-03-11 NOTE — CONSULTS
PODIATRIC MEDICINE AND SURGERY  CONSULT HISTORY AND PHYSICAL    Consulting Service: Critical care  Requesting Provider: VICKY Elliott CNP  Opinion/advice regarding: Foot care  Staff Doctor:  Cherylene Second, DPM    ASSESSMENT:     78 y.o. male with PMH significant for CKD, history of DVT, hypertension, basal cell carcinoma for who podiatry was consulted for painful toe nails and its nail debridement and foot care    PLAN AND RECOMMENDATIONS:     - Nails 1-5 B/L were debrided with nail nippers right hallux nail pulled out from the nailbed with some bleeding.  - Bacitracin ointment applied to the nailbed followed by Band-Aid. -Recommend please apply ammonium lactate to bilateral lower extremity. Order placed in.  - Recommend bilateral Prevalon boots while resting in the bed. - Patient will need follow up with podiatry in 2-3 months for routine nail care. - Podiatry follow-up tomorrow    Patient's case to be discussed with staff, Dr. Magdalena Joseph, who will provide final recommendations going forward    SUBJECTIVE:     HPI: This 78y.o. year old male was seen today for toenail debridement. Patient was unable to communicate today was not alert oriented. Past Medical History:   Diagnosis Date    Ascending aorta dilatation (HCC)     Basal cell carcinoma     CKD (chronic kidney disease)     DVT (deep venous thrombosis) ([facility] [address]        History reviewed. No pertinent surgical history. No current facility-administered medications on file prior to encounter.      Current Outpatient Medications on File Prior to Encounter   Medication Sig Dispense Refill    triamterene-hydroCHLOROthiazide (DYAZIDE) 37.5-25 MG per capsule TAKE 1 CAPSULE BY MOUTH EVERY DAY      pravastatin (PRAVACHOL) 20 MG tablet TAKE 1 TABLET BY MOUTH EVERYDAY AT BEDTIME (Patient not taking: Reported on 3/11/2023)      metoprolol succinate (TOPROL XL) 25 MG extended release tablet Take by mouth daily      levothyroxine (SYNTHROID) 125 MCG tablet TAKE 1 TABLET BY MOUTH EVERY DAY      cyanocobalamin 1000 MCG/ML injection Inject into the muscle (Patient not taking: Reported on 3/11/2023)      aspirin 81 MG EC tablet Take by mouth daily (Patient not taking: Reported on 3/11/2023)      pravastatin (PRAVACHOL) 20 MG tablet TAKE 1 TABLET BY MOUTH EVERYDAY AT BEDTIME      ELIQUIS 5 MG TABS tablet TAKE 1 TABLET BY MOUTH TWICE A DAY         No Known Allergies    History reviewed. No pertinent family history. Social History     Socioeconomic History    Marital status:      Spouse name: Not on file    Number of children: Not on file    Years of education: Not on file    Highest education level: Not on file   Occupational History    Not on file   Tobacco Use    Smoking status: Former     Types: Cigarettes     Passive exposure: Past    Smokeless tobacco: Never   Substance and Sexual Activity    Alcohol use: Yes     Alcohol/week: 14.0 standard drinks     Types: 14 Cans of beer per week     Comment: 1-2 regular cans of beer a day    Drug use: Never    Sexual activity: Never   Other Topics Concern    Not on file   Social History Narrative    Not on file     Social Determinants of Health     Financial Resource Strain: Not on file   Food Insecurity: Not on file   Transportation Needs: Not on file   Physical Activity: Not on file   Stress: Not on file   Social Connections: Not on file   Intimate Partner Violence: Not on file   Housing Stability: Not on file       Review of Systems  None obtained as patient as sleep   OBJECTIVE:     BP (!) 163/135   Pulse 90   Temp (!) 95.6 °F (35.3 °C) (Bladder)   Resp 21   Ht 6' (1.829 m)   Wt 189 lb 6.4 oz (85.9 kg)   SpO2 99%   BMI 25.69 kg/m²   Patient is not alert or oriented at all.      Vascular:   Palpable Dorsalis Pedis and Palpable Posterior Tibial Pulses B/L   Capillary Fill time < 5 seconds to B/L digits  Skin temperature warm to warm tibial tuberosity to the digits B/L  Hair growth absent to digits  mild edema  No varicosities     Neurological:   Sensation to light touch intact B/L    Musculoskeletal/Orthopaedic:   Unable to assess muscle strength. Dermatological:   Skin xerotic, scaling with webspace maceration noted. Atrophic skin. None hyperkeratotic lesions noted  Nails 1-5 B/L appear thickened, elongated, and mycotic. Rome horn type nails. Pain due to thickness and elongation. Interspaces 1-4 B/L are with debris      LABS:     Lab Results   Component Value Date    WBC 8.7 03/11/2023    HGB 11.6 (L) 03/11/2023    HCT 34.5 (L) 03/11/2023    MCV 94.4 (H) 03/11/2023    PLT 99 (L) 03/11/2023     Lab Results   Component Value Date/Time     03/11/2023 03:45 AM    K 3.0 03/11/2023 03:45 AM     03/11/2023 03:45 AM    CO2 19 03/11/2023 03:45 AM    BUN 92 03/11/2023 03:45 AM    CREATININE 2.90 03/11/2023 03:45 AM    GLUCOSE 112 03/11/2023 03:45 AM    CALCIUM 7.6 03/11/2023 03:45 AM      Lab Results   Component Value Date    LABALBU 2.4 (L) 03/11/2023     No results found for: SEDRATE  No results found for: CRP  No results found for: LABA1C    MICROBIOLOGY   None obtained by podiatry     IMAGING:     None ordered.          Lorelei Galindo DPM,PGY-2   Podiatric Surgery Resident  Podiatry On Call Pager: [phone]  March 11, 2023  3:16 PM

## 2023-03-11 NOTE — PROGRESS NOTES
Physical Therapy Missed Treatment   Facility/Department: [facility] MED SURG IC08/IC08-01    NAME: Vinicius Rudd    : 1940 (78 y.o.)  MRN: [id number]    Account: [de-identified]  Gender: male      Chart reviewed. Patient has pending podiatry consult. Will hold evaluation at this time. Will follow and attempt PT evaluation again at earliest availability.        Deepti Phillips, PT, 23 at 3:06 PM

## 2023-03-11 NOTE — PROGRESS NOTES
Patient arrived to ICU 8 from ED cart. Patient assessed, v/s obtained see flowsheets. Multiple wounds, ecchymotic areas and abrasion to chest noted. Skin check with this nurse and Jace/CNP. Bear hugger in place for body temp increase.

## 2023-03-11 NOTE — H&P
[place] [address] MEDICINE    HISTORY AND PHYSICAL EXAM    PATIENT NAME:  Andrew Kim    MRN:  [id number]  SERVICE DATE:  3/10/2023   SERVICE TIME:  8:22 PM    Primary Care Physician: No primary care provider on file. SUBJECTIVE  CHIEF COMPLAINT:  Fall       HPI: Patient brought into the ED today after being found down at home by his son. Patient's son believes that he was down for probably 4 days. Patient's son reports that he is typically alert and oriented x3 and cares for his own ADLs. They have not heard from him for several days and when he went to check on him therefore newspapers at the front door and his father typically gets his newspaper every day. When he went inside he found his father and the bathroom door and he was unable to get up. Patient currently responds to verbal and painful stimuli but cannot give any history concerning current as well as past medical history. Pan scans and x-rays were completed with no obvious fractures or signs of internal bleeding. Patient was hypotensive and hypothermic on arrival to the ED. Patient's past medical history includes DVT on [address], HTN, HLD, hypothyroidism, and mild HF with EF of 50%. Patient also has a past history of ascending aortic dilation and aortic stenosis. Patient has no history of stroke or MI. Patient's next of kin is his son whose name is also Carin Luciano. Patient's son request that patient be DNR CCA with no CPR or intubation unless patient becomes able to decide otherwise. PAST MEDICAL HISTORY:    Past Medical History:   Diagnosis Date    Ascending aorta dilatation (HCC)     Basal cell carcinoma     CKD (chronic kidney disease)     DVT (deep venous thrombosis) ([facility] [address]      PAST SURGICAL HISTORY:  History reviewed. No pertinent surgical history. FAMILY HISTORY:  History reviewed. No pertinent family history. SOCIAL HISTORY:    Social History     Socioeconomic History    Marital status:       Spouse name: Not on file Number of children: Not on file    Years of education: Not on file    Highest education level: Not on file   Occupational History    Not on file   Tobacco Use    Smoking status: Former     Types: Cigarettes     Passive exposure: Past    Smokeless tobacco: Never   Substance and Sexual Activity    Alcohol use: Yes     Alcohol/week: 14.0 standard drinks     Types: 14 Cans of beer per week     Comment: 1-2 regular cans of beer a day    Drug use: Never    Sexual activity: Never   Other Topics Concern    Not on file   Social History Narrative    Not on file     Social Determinants of Health     Financial Resource Strain: Not on file   Food Insecurity: Not on file   Transportation Needs: Not on file   Physical Activity: Not on file   Stress: Not on file   Social Connections: Not on file   Intimate Partner Violence: Not on file   Housing Stability: Not on file     MEDICATIONS:   Prior to Admission medications    Medication Sig Start Date End Date Taking? Authorizing Provider   triamterene-hydroCHLOROthiazide (DYAZIDE) 37.5-25 MG per capsule TAKE 1 CAPSULE BY MOUTH EVERY DAY 11/2/22  Yes Historical Provider, MD   pravastatin (PRAVACHOL) 20 MG tablet TAKE 1 TABLET BY MOUTH EVERYDAY AT BEDTIME 4/28/22  Yes Historical Provider, MD   metoprolol succinate (TOPROL XL) 25 MG extended release tablet Take by mouth daily 1/12/23  Yes Historical Provider, MD   levothyroxine (SYNTHROID) 125 MCG tablet TAKE 1 TABLET BY MOUTH EVERY DAY 4/28/22  Yes Historical Provider, MD   cyanocobalamin 1000 MCG/ML injection Inject into the muscle 2/16/23 1/18/24 Yes Historical Provider, MD   aspirin 81 MG EC tablet Take by mouth daily 4/13/11  Yes Historical Provider, MD   pravastatin (PRAVACHOL) 20 MG tablet TAKE 1 TABLET BY MOUTH EVERYDAY AT BEDTIME 2/10/23   Historical Provider, MD   ELIQUIS 5 MG TABS tablet TAKE 1 TABLET BY MOUTH TWICE A DAY 2/18/23   Historical Provider, MD       ALLERGIES: Patient has no known allergies.     REVIEW OF SYSTEM: Review of Systems   Unable to perform ROS: Acuity of condition       OBJECTIVE  PHYSICAL EXAM: /74   Pulse 82   Temp (!) 93.4 °F (34.1 °C) (Bladder)   Resp 19   Ht 6' (1.829 m)   Wt 190 lb (86.2 kg)   SpO2 95%   BMI 25.77 kg/m²     Physical Exam  Vitals and nursing note reviewed. Constitutional:       Appearance: He is well-developed. He is ill-appearing. HENT:      Head: Normocephalic and atraumatic. Nose: Nose normal.      Mouth/Throat:      Mouth: Mucous membranes are dry. Eyes:      Pupils: Pupils are equal, round, and reactive to light. Cardiovascular:      Rate and Rhythm: Normal rate and regular rhythm. Pulses: Normal pulses. Heart sounds: Normal heart sounds. Pulmonary:      Effort: Pulmonary effort is normal. No respiratory distress. Breath sounds: Normal breath sounds. No wheezing, rhonchi or rales. Abdominal:      General: Bowel sounds are normal.      Palpations: Abdomen is soft. There is no mass. Tenderness: There is no abdominal tenderness. Musculoskeletal:         General: Normal range of motion. Cervical back: Normal range of motion. Right lower le+ Pitting Edema present. Left lower le+ Pitting Edema present. Lymphadenopathy:      Cervical: No cervical adenopathy. Skin:     General: Skin is dry. Capillary Refill: Capillary refill takes less than 2 seconds. Findings: Abrasion and bruising present. Comments: Multiple abrasions improving lower extremities. Large sacral ulcer and 2 back ulcers. ; see photo below. Neurological:      Mental Status: He is disoriented. Deep Tendon Reflexes: Reflexes normal.     Sacral pressure ulcer:      Thoracic pressure ulcer:        DATA:     Diagnostic tests reviewed for today's visit:    Most recent labs and imaging results reviewed.      LABS:    Recent Results (from the past 24 hour(s))   CBC with Auto Differential    Collection Time: 03/10/23  2:30 PM   Result Value Ref Range    WBC 12.9 (H) 4.8 - 10.8 K/uL    RBC 4.23 (L) 4.70 - 6.10 M/uL    Hemoglobin 13.6 (L) 14.0 - 18.0 g/dL    Hematocrit 41.0 (L) 42.0 - 52.0 %    MCV 96.9 (H) 79.0 - 92.2 fL    MCH 32.0 (H) 27.0 - 31.3 pg    MCHC 33.0 33.0 - 37.0 %    RDW 14.3 11.5 - 14.5 %    Platelets 249 (L) 346 - 400 K/uL    PLATELET SLIDE REVIEW Decreased     Neutrophils % 88.0 %    Lymphocytes % 3.0 %    Monocytes % 3.8 %    Eosinophils % 0.3 %    Basophils % 0.2 %    Neutrophils Absolute 12.1 (H) 1.4 - 6.5 K/uL    Lymphocytes Absolute 0.4 (L) 1.0 - 4.8 K/uL    Monocytes Absolute 0.5 0.2 - 0.8 K/uL    Eosinophils Absolute 0.0 0.0 - 0.7 K/uL    Basophils Absolute 0.0 0.0 - 0.2 K/uL    Bands Relative 6 %    Poikilocytes 1+    CMP    Collection Time: 03/10/23  2:30 PM   Result Value Ref Range    Sodium 146 (H) 135 - 144 mEq/L    Potassium 3.6 3.4 - 4.9 mEq/L    Chloride 102 95 - 107 mEq/L    CO2 19 (L) 20 - 31 mEq/L    Anion Gap 25 (H) 9 - 15 mEq/L    Glucose 149 (H) 70 - 99 mg/dL    BUN 92 (HH) 8 - 23 mg/dL    Creatinine 2.63 (H) 0.70 - 1.20 mg/dL    Est, Glom Filt Rate 23.1 (L) >60    Calcium 8.8 8.5 - 9.9 mg/dL    Total Protein 6.2 (L) 6.3 - 8.0 g/dL    Albumin 3.3 (L) 3.5 - 4.6 g/dL    Total Bilirubin 2.3 (H) 0.2 - 0.7 mg/dL    Alkaline Phosphatase 132 (H) 35 - 104 U/L     (H) 0 - 41 U/L     (H) 0 - 40 U/L    Globulin 2.9 2.3 - 3.5 g/dL   Lactic Acid    Collection Time: 03/10/23  2:30 PM   Result Value Ref Range    Lactic Acid 4.4 (HH) 0.5 - 2.2 mmol/L   Procalcitonin    Collection Time: 03/10/23  2:30 PM   Result Value Ref Range    Procalcitonin 1.90 (H) 0.00 - 0.15 ng/mL   Urinalysis    Collection Time: 03/10/23  2:30 PM   Result Value Ref Range    Color, UA DARK YELLOW (A) Straw/Yellow    Clarity, UA CLOUDY (A) Clear    Glucose, Ur Negative Negative mg/dL    Bilirubin Urine MODERATE (A) Negative    Ketones, Urine 15 (A) Negative mg/dL    Specific Gravity, UA 1.020 1.005 - 1.030    Blood, Urine SMALL (A) Negative pH, UA 5.5 5.0 - 9.0    Protein, UA 30 (A) Negative mg/dL    Urobilinogen, Urine 1.0 <2.0 E.U./dL    Nitrite, Urine Negative Negative    Leukocyte Esterase, Urine TRACE (A) Negative   Protime-INR    Collection Time: 03/10/23  2:30 PM   Result Value Ref Range    Protime 15.1 (H) 12.3 - 14.9 sec    INR 1.2    APTT    Collection Time: 03/10/23  2:30 PM   Result Value Ref Range    aPTT 28.2 24.4 - 36.8 sec   TYPE AND SCREEN    Collection Time: 03/10/23  2:30 PM   Result Value Ref Range    ABO/Rh A POS     Antibody Screen NEG    Troponin    Collection Time: 03/10/23  2:30 PM   Result Value Ref Range    Troponin 0.020 (HH) 0.000 - 0.010 ng/mL   CK    Collection Time: 03/10/23  2:30 PM   Result Value Ref Range    Total CK 2,753 (H) 0 - 190 U/L   Magnesium    Collection Time: 03/10/23  2:30 PM   Result Value Ref Range    Magnesium 3.0 (H) 1.7 - 2.4 mg/dL   Microscopic Urinalysis    Collection Time: 03/10/23  2:30 PM   Result Value Ref Range    Bacteria, UA Negative Negative /HPF    Hyaline Casts, UA 10-20 0 - 5 /HPF    WBC, UA 10-20 (A) 0 - 5 /HPF    RBC, UA 10-20 (A) 0 - 5 /HPF    Epithelial Cells, UA 20-50 0 - 5 /HPF   Ammonia    Collection Time: 03/10/23  3:00 PM   Result Value Ref Range    Ammonia 45 16 - 60 umol/L   POCT Glucose    Collection Time: 03/10/23  3:07 PM   Result Value Ref Range    POC Glucose 116 (H) 70 - 99 mg/dl    Performed on ACCU-CHEK    POCT Glucose    Collection Time: 03/10/23  3:09 PM   Result Value Ref Range    Glucose 116 mg/dL   POCT CREATININE    Collection Time: 03/10/23  3:10 PM   Result Value Ref Range    POC CREATININE WHOLE BLOOD 2.9    EKG 12 Lead    Collection Time: 03/10/23  5:23 PM   Result Value Ref Range    Ventricular Rate 66 BPM    Atrial Rate 66 BPM    P-R Interval 190 ms    QRS Duration 96 ms    Q-T Interval 534 ms    QTc Calculation (Bazett) 559 ms    P Axis 81 degrees    R Axis 73 degrees    T Axis 9 degrees   ETOH    Collection Time: 03/10/23  5:30 PM   Result Value Ref Range Ethanol Lvl <10 mg/dL    Ethanol percent Not indicated G/dL   Lactate, Sepsis    Collection Time: 03/10/23  7:26 PM   Result Value Ref Range    Lactic Acid, Sepsis 2.0 (H) 0.5 - 1.9 mmol/L   POCT Arterial    Collection Time: 03/10/23  8:02 PM   Result Value Ref Range    POC Sodium 149 (H) 136 - 145 mEq/L    POC Potassium 2.8 (LL) 3.5 - 5.1 mEq/L    POC Chloride 117 (H) 99 - 110 mEq/L    POC Glucose 100 (H) 70 - 99 mg/dl    POC Creatinine 2.8 (H) 0.8 - 1.3 mg/dL    Est, Glom Filt Rate 21 (A) >60    Calcium, Ionized 1.07 (L) 1.12 - 1.32 mmol/L    pH, Arterial 7.361 7.350 - 7.450    pCO2, Arterial 34 (L) 35 - 45 mm Hg    pO2, Arterial 62 (HH) 75 - 108 mm Hg    HCO3, Arterial 19.0 (L) 21.0 - 29.0 mmol/L    Base Excess, Arterial -7 (L) -3 - 3    O2 Sat, Arterial 91 (HH) 93 - 100 %    TCO2, Arterial 20 (L) 21 - 32 mmol/L    Lactate 1.29 0.40 - 2.00 mmol/L    POC Hematocrit 30 (L) 41 - 53 %    Hemoglobin 10.3 (L) 13.5 - 17.5 gm/dL    FIO2 3.000     Sample Type ART     Performed on SEE BELOW        IMAGING:   CT ABDOMEN PELVIS WO CONTRAST Additional Contrast? None    Result Date: 3/10/2023  Atraumatic appearance of the abdomen and pelvis. Prostatomegaly. Right renal atrophy. XR FEMUR RIGHT (MIN 2 VIEWS)    Result Date: 3/10/2023  No acute osseous abnormality. CT Head W/O Contrast    Result Date: 3/10/2023  CT HEAD WITHOUT CONTRAST: 1. No skull fracture or acute intracranial abnormality. CT CERVICAL SPINE WITHOUT CONTRAST: 1. No fracture or joint dislocation is seen in the cervical spine. 2. Mild age indeterminate compression fracture deformity in the T1 vertebral body. If indicated, MRI may be obtained for further evaluation. 3. Degenerative changes, as described. CT CHEST WO CONTRAST    Result Date: 3/10/2023  Atraumatic appearance of the chest. Patchy right upper lobe opacities consistent with pneumonia. CT CERVICAL SPINE WO CONTRAST    Result Date: 3/10/2023  CT HEAD WITHOUT CONTRAST: 1.   No skull fracture or acute intracranial abnormality. CT CERVICAL SPINE WITHOUT CONTRAST: 1. No fracture or joint dislocation is seen in the cervical spine. 2. Mild age indeterminate compression fracture deformity in the T1 vertebral body. If indicated, MRI may be obtained for further evaluation. 3. Degenerative changes, as described. CT THORACIC SPINE WO CONTRAST    Result Date: 3/10/2023  No evidence of acute thoracic spine trauma. CT LUMBAR SPINE WO CONTRAST    Result Date: 3/10/2023  No evidence of acute lumbar spine trauma. Multilevel degenerative changes. XR CHEST PORTABLE    Result Date: 3/10/2023  Right internal jugular line tip in the proximal SVC. No pneumothorax. XR HIP 2-3 VW W PELVIS RIGHT    Result Date: 3/10/2023  No acute abnormality of the pelvis and right hip. VTE Prophylaxis: Full dose Lovenox. Patient on [address] [place] [address] Road at home for DVT. ASSESSMENT AND PLAN    Principal Problem:  Sepsis shock: Hypotension and hypothermic after possible fall at home. Temp 88.5 and BP 78/47 on arrival to ED. Dehydration and LEORA with elevated CK, and elevated liver enzymes. Lactic acid elevated patient given 4 L NS in ED. Altered mental status. WBC 12.9. Procalcitonin 1.90. Patient placed on Patrick hugger. Patient requiring pressors. Central line placed in ED. UA negative. Blood culture sent x2. We will monitor closely in ICU setting. We will continue pressors and fluid resuscitation. We will continue warming with Patrick hugger and monitor closely. Sepsis of unknown etiology chest x-ray suggestive of possible pneumonia versus aspiration pneumonia. We will get respiratory panel. We will empirically treat now with Unasyn, vancomycin, and Levaquin. Hypotension: BP 78/47 requiring pressors. We will continue pressors and fluid hydration. We will monitor BP closely in ICU setting. Rhabdomyolysis: Total CK 2753. Likely due to fall and being down for as much is 4 days.   Patient given 4 L NS in ED.  We will continue IV fluid resuscitation. We will monitor CK every 6 hours. LEORA on CKD3: Dehydration. Creatinine 2.63. Baseline around 1.2. Likely due to poor oral intake for extended period of time. Patient given 4 L NS in ED. We will strictly monitor inputs and outputs and continue IV fluid resuscitation. Will monitor for signs of urine retention    Elevated troponin: Troponin 0.020. EKG shows sinus without ST elevation. Likely due to LEORA and ischemia due to hypoperfusion. We will cycle troponin and repeat EKG. We will keep patient on telemetry monitoring. If needed, we will consult cardiology. Elevated LFTs: Alk phos 132, , , bilirubin 2.3. Ammonia 45. Likely shock liver. Patient received 4 L NS in ED and placed on pressors. We will repeat CMP now and monitor with CMP daily. We will continue IV fluid resuscitation. If no improvement, we will consult GI    Elevated lactic acid: Lactic acid 4.4. Patient given 4 L NS in ED. Repeat lactic acid 2.0. We will continue to trend lactic acid. Hypothermia: Temp 88.5. Patient currently on [employer] [facility]. Exposure versus sepsis. We will continue to monitor closely with indwelling catheter    AMS: Fall at home. Typically A&O x3. Likely due to sepsis shock. Head CT negative. We will continue to monitor. If needed, we will consult neurology. Active problems:  History of DVT: Patient on [address]. Likely has not taken in the last 4 days and cannot take oral medications at this time. We will administer full dose Lovenox daily given renal function. We will resume home meds when normal diet is resumed. HTN: Patient on home meds to control. We will hold home meds until blood pressure stabilized. Patient currently requiring pressors for hypotension. HLD: Patient on statin at home. We will resume with normal diet  Hypothyroidism: Patient on Synthroid. We will resume with normal diet. HF: EF of 58%.   Patient on beta-blocker.   We will monitor fluid status closely and resume with normal diet    Plan of care discussed with: patient and adult child    SIGNATURE: VICKY Taylor - CNP  DATE: March 10, 2023  TIME: 8:22 PM     Moraima Gore DO - supervising physician

## 2023-03-11 NOTE — PROGRESS NOTES
Renal Adjustment Per Protocol:     Levaquin 500mg IV q24hr changed to Levaquin 750mg IV q48hr based on CrCl 20-49 ml/min and possible pneumonia. Recent Labs     03/10/23  2002   CREATININE 2.8*   Estimated Creatinine Clearance: 21 mL/min (A) (based on SCr of 2.8 mg/dL (H)). RONDA Bolanos Ph.  3/10/2023  10:50 PM

## 2023-03-12 LAB
ALBUMIN SERPL-MCNC: 2.1 G/DL (ref 3.5–4.6)
ALP BLD-CCNC: 82 U/L (ref 35–104)
ALT SERPL-CCNC: 198 U/L (ref 0–41)
ANION GAP SERPL CALCULATED.3IONS-SCNC: 10 MEQ/L (ref 9–15)
ANION GAP SERPL CALCULATED.3IONS-SCNC: 13 MEQ/L (ref 9–15)
AST SERPL-CCNC: 90 U/L (ref 0–40)
BASOPHILS ABSOLUTE: 0 K/UL (ref 0–0.2)
BASOPHILS RELATIVE PERCENT: 0.1 %
BILIRUB SERPL-MCNC: 1.3 MG/DL (ref 0.2–0.7)
BUN BLDV-MCNC: 92 MG/DL (ref 8–23)
BUN BLDV-MCNC: 93 MG/DL (ref 8–23)
CALCIUM SERPL-MCNC: 7.6 MG/DL (ref 8.5–9.9)
CALCIUM SERPL-MCNC: 7.8 MG/DL (ref 8.5–9.9)
CHLORIDE BLD-SCNC: 119 MEQ/L (ref 95–107)
CHLORIDE BLD-SCNC: 121 MEQ/L (ref 95–107)
CO2: 21 MEQ/L (ref 20–31)
CO2: 21 MEQ/L (ref 20–31)
CREAT SERPL-MCNC: 3.11 MG/DL (ref 0.7–1.2)
CREAT SERPL-MCNC: 3.28 MG/DL (ref 0.7–1.2)
CULTURE, BLOOD ID SENSITIVITY: ABNORMAL
EOSINOPHILS ABSOLUTE: 0 K/UL (ref 0–0.7)
EOSINOPHILS RELATIVE PERCENT: 0 %
GFR SERPL CREATININE-BSD FRML MDRD: 17.7 ML/MIN/{1.73_M2}
GFR SERPL CREATININE-BSD FRML MDRD: 18.9 ML/MIN/{1.73_M2}
GLOBULIN: 2.5 G/DL (ref 2.3–3.5)
GLUCOSE BLD-MCNC: 180 MG/DL (ref 70–99)
GLUCOSE BLD-MCNC: 218 MG/DL (ref 70–99)
HCT VFR BLD CALC: 30 % (ref 42–52)
HEMOGLOBIN: 10.1 G/DL (ref 14–18)
LYMPHOCYTES ABSOLUTE: 0.4 K/UL (ref 1–4.8)
LYMPHOCYTES RELATIVE PERCENT: 4.9 %
MAGNESIUM: 2.3 MG/DL (ref 1.7–2.4)
MCH RBC QN AUTO: 31.6 PG (ref 27–31.3)
MCHC RBC AUTO-ENTMCNC: 33.7 % (ref 33–37)
MCV RBC AUTO: 93.7 FL (ref 79–92.2)
MONOCYTES ABSOLUTE: 0.4 K/UL (ref 0.2–0.8)
MONOCYTES RELATIVE PERCENT: 5 %
NEUTROPHILS ABSOLUTE: 8 K/UL (ref 1.4–6.5)
NEUTROPHILS RELATIVE PERCENT: 90 %
ORGANISM: ABNORMAL
PDW BLD-RTO: 14.5 % (ref 11.5–14.5)
PLATELET # BLD: 82 K/UL (ref 130–400)
POTASSIUM SERPL-SCNC: 2.8 MEQ/L (ref 3.4–4.9)
POTASSIUM SERPL-SCNC: 3.2 MEQ/L (ref 3.4–4.9)
RBC # BLD: 3.2 M/UL (ref 4.7–6.1)
SODIUM BLD-SCNC: 150 MEQ/L (ref 135–144)
SODIUM BLD-SCNC: 155 MEQ/L (ref 135–144)
TOTAL CK: 1080 U/L (ref 0–190)
TOTAL CK: 608 U/L (ref 0–190)
TOTAL CK: 955 U/L (ref 0–190)
TOTAL PROTEIN: 4.6 G/DL (ref 6.3–8)
VANCOMYCIN RANDOM: 10 UG/ML (ref 10–40)
WBC # BLD: 8.9 K/UL (ref 4.8–10.8)

## 2023-03-12 PROCEDURE — 36415 COLL VENOUS BLD VENIPUNCTURE: CPT

## 2023-03-12 PROCEDURE — 2580000003 HC RX 258: Performed by: INTERNAL MEDICINE

## 2023-03-12 PROCEDURE — 6360000002 HC RX W HCPCS: Performed by: INTERNAL MEDICINE

## 2023-03-12 PROCEDURE — 1210000000 HC MED SURG R&B

## 2023-03-12 PROCEDURE — 85025 COMPLETE CBC W/AUTO DIFF WBC: CPT

## 2023-03-12 PROCEDURE — 80053 COMPREHEN METABOLIC PANEL: CPT

## 2023-03-12 PROCEDURE — 2500000003 HC RX 250 WO HCPCS: Performed by: INTERNAL MEDICINE

## 2023-03-12 PROCEDURE — 83735 ASSAY OF MAGNESIUM: CPT

## 2023-03-12 PROCEDURE — 6360000002 HC RX W HCPCS: Performed by: NURSE PRACTITIONER

## 2023-03-12 PROCEDURE — 80202 ASSAY OF VANCOMYCIN: CPT

## 2023-03-12 PROCEDURE — 99291 CRITICAL CARE FIRST HOUR: CPT | Performed by: INTERNAL MEDICINE

## 2023-03-12 PROCEDURE — 2580000003 HC RX 258: Performed by: NURSE PRACTITIONER

## 2023-03-12 PROCEDURE — 36592 COLLECT BLOOD FROM PICC: CPT

## 2023-03-12 PROCEDURE — 82550 ASSAY OF CK (CPK): CPT

## 2023-03-12 PROCEDURE — C9113 INJ PANTOPRAZOLE SODIUM, VIA: HCPCS | Performed by: INTERNAL MEDICINE

## 2023-03-12 PROCEDURE — A4216 STERILE WATER/SALINE, 10 ML: HCPCS | Performed by: INTERNAL MEDICINE

## 2023-03-12 PROCEDURE — 6370000000 HC RX 637 (ALT 250 FOR IP)

## 2023-03-12 RX ORDER — DEXTROSE, SODIUM CHLORIDE, AND POTASSIUM CHLORIDE 5; .2; .15 G/100ML; G/100ML; G/100ML
INJECTION INTRAVENOUS CONTINUOUS
Status: DISCONTINUED | OUTPATIENT
Start: 2023-03-12 | End: 2023-03-15 | Stop reason: HOSPADM

## 2023-03-12 RX ORDER — POTASSIUM CHLORIDE 7.45 MG/ML
10 INJECTION INTRAVENOUS
Status: COMPLETED | OUTPATIENT
Start: 2023-03-13 | End: 2023-03-13

## 2023-03-12 RX ORDER — FUROSEMIDE 10 MG/ML
40 INJECTION INTRAMUSCULAR; INTRAVENOUS ONCE
Status: COMPLETED | OUTPATIENT
Start: 2023-03-12 | End: 2023-03-12

## 2023-03-12 RX ADMIN — PIPERACILLIN AND TAZOBACTAM 3375 MG: 3; .375 INJECTION, POWDER, FOR SOLUTION INTRAVENOUS at 08:59

## 2023-03-12 RX ADMIN — POTASSIUM CHLORIDE, DEXTROSE MONOHYDRATE AND SODIUM CHLORIDE: 150; 5; 200 INJECTION, SOLUTION INTRAVENOUS at 15:32

## 2023-03-12 RX ADMIN — FENTANYL CITRATE 25 MCG: 0.05 INJECTION, SOLUTION INTRAMUSCULAR; INTRAVENOUS at 13:42

## 2023-03-12 RX ADMIN — PIPERACILLIN AND TAZOBACTAM 3375 MG: 3; .375 INJECTION, POWDER, FOR SOLUTION INTRAVENOUS at 21:53

## 2023-03-12 RX ADMIN — ENOXAPARIN SODIUM 90 MG: 100 INJECTION SUBCUTANEOUS at 09:00

## 2023-03-12 RX ADMIN — POTASSIUM CHLORIDE, DEXTROSE MONOHYDRATE AND SODIUM CHLORIDE: 150; 5; 200 INJECTION, SOLUTION INTRAVENOUS at 23:23

## 2023-03-12 RX ADMIN — Medication: at 09:01

## 2023-03-12 RX ADMIN — FUROSEMIDE 40 MG: 10 INJECTION, SOLUTION INTRAMUSCULAR; INTRAVENOUS at 12:06

## 2023-03-12 RX ADMIN — SODIUM CHLORIDE, PRESERVATIVE FREE 40 MG: 5 INJECTION INTRAVENOUS at 21:48

## 2023-03-12 RX ADMIN — SODIUM CHLORIDE, PRESERVATIVE FREE 40 MG: 5 INJECTION INTRAVENOUS at 09:00

## 2023-03-12 RX ADMIN — FENTANYL CITRATE 25 MCG: 0.05 INJECTION, SOLUTION INTRAMUSCULAR; INTRAVENOUS at 09:41

## 2023-03-12 RX ADMIN — SODIUM CHLORIDE, PRESERVATIVE FREE 10 ML: 5 INJECTION INTRAVENOUS at 09:02

## 2023-03-12 RX ADMIN — SODIUM CHLORIDE, PRESERVATIVE FREE 10 ML: 5 INJECTION INTRAVENOUS at 21:48

## 2023-03-12 RX ADMIN — DEXTROSE AND SODIUM CHLORIDE 100 ML/HR: 5; 450 INJECTION, SOLUTION INTRAVENOUS at 05:10

## 2023-03-12 ASSESSMENT — PAIN SCALES - GENERAL
PAINLEVEL_OUTOF10: 6
PAINLEVEL_OUTOF10: 0

## 2023-03-12 ASSESSMENT — ENCOUNTER SYMPTOMS
SHORTNESS OF BREATH: 1
EYE DISCHARGE: 0
ABDOMINAL DISTENTION: 0

## 2023-03-12 NOTE — CONSULTS
Chief Complaint   Patient presents with    Fall        Patient is a 78 y.o. male who presents with a chief complaint of fall. Patient is followed on a regular basis by Dr. Nichole Melendez primary care provider on file. Patient currently seen intensive care unit. Unable to provide any information due to mental status change, most of the information was obtained from the staff as well as chart. Apparently patient was found down on the ground after 4 days. Apparently he was independent up to few weeks ago and driving. Patient noted to be hypotensive as well as hypothermic on arrival to the emergency department. Does have history of chronic DVT on oral anticoagulation, hyperlipidemia, hypertension hypothyroidism, aortic stenosis as well as dilated ascending aorta. Noted to have total CK of 2700 and mildly elevated cardiac enzyme and a flat pattern potassium of three and acute kidney injury of 3.28 creatinine. Patient is DNR CCA. Status post echocardiogram on December 13, 2022 at [facility] with ejection fraction of 60%, dilated ascending aorta measuring 4.2 cm, mild aortic stenosis with a mean gradient of 14 mmHg. Past Medical History:   Diagnosis Date    Ascending aorta dilatation (HCC)     Basal cell carcinoma     CKD (chronic kidney disease)     DVT (deep venous thrombosis) [facility])       Patient Active Problem List   Diagnosis    Shock ([address]       History reviewed. No pertinent surgical history. Social History     Socioeconomic History    Marital status:      Spouse name: None    Number of children: None    Years of education: None    Highest education level: None   Tobacco Use    Smoking status: Former     Types: Cigarettes     Passive exposure: Past    Smokeless tobacco: Never   Substance and Sexual Activity    Alcohol use: Yes     Alcohol/week: 14.0 standard drinks     Types: 14 Cans of beer per week     Comment: 1-2 regular cans of beer a day    Drug use: Never    Sexual activity: Never       History reviewed. No pertinent family history.     Current Facility-Administered Medications   Medication Dose Route Frequency Provider Last Rate Last Admin    sodium chloride flush 0.9 % injection 5-40 mL  5-40 mL IntraVENous 2 times per day VICKY Mason CNP   10 mL at 03/12/23 0902    sodium chloride flush 0.9 % injection 5-40 mL  5-40 mL IntraVENous PRN Stephanie Nixon, VICKY - CNP        0.9 % sodium chloride infusion   IntraVENous PRN VICKY Mason CNP        [Held by provider] ondansetron (ZOFRAN-ODT) disintegrating tablet 4 mg  4 mg Oral Q8H PRN VICKY Mason CNP        Or    [Held by provider] ondansetron (ZOFRAN) injection 4 mg  4 mg IntraVENous Q6H PRN VICKY Mason - SUZETTE        polyethylene glycol (GLYCOLAX) packet 17 g  17 g Oral Daily PRN VICKY Mason - SUZETTE        acetaminophen (TYLENOL) tablet 650 mg  650 mg Oral Q6H PRN VICKY Mason - CNP        Or    acetaminophen (TYLENOL) suppository 650 mg  650 mg Rectal Q6H PRN VICKY Mason - CNP        trimethobenzamide (TIGAN) injection 200 mg  200 mg IntraMUSCular Q6H PRN VICKY Mason - CNP        dextrose 5 % and 0.45 % sodium chloride infusion   IntraVENous Continuous Windell Dakins,  mL/hr at 03/12/23 0526 Rate Verify at 03/12/23 0526    potassium chloride 20 mEq/50 mL IVPB (Central Line)  20 mEq IntraVENous PRN Windell Dakins, MD   Stopped at 03/11/23 1151    pantoprazole (PROTONIX) 40 mg in sodium chloride (PF) 0.9 % 10 mL injection  40 mg IntraVENous Q12H Guido Nava MD   40 mg at 03/12/23 0900    piperacillin-tazobactam (ZOSYN) 3,375 mg in sodium chloride 0.9 % 50 mL IVPB (mini-bag)  3,375 mg IntraVENous Q12H Windell Dakins, MD 12.5 mL/hr at 03/12/23 0859 3,375 mg at 03/12/23 0859    fentaNYL (SUBLIMAZE) injection 25 mcg  25 mcg IntraVENous Q2H PRN VICKY Theodore - CNP   25 mcg at 03/12/23 0941    ammonium lactate (LAC-HYDRIN) 12 % lotion   Topical Daily Zhou Yennifer, DPM   Given at 03/12/23 0901    dexmedetomidine (PRECEDEX) 1,000 mcg in sodium chloride 0.9 % 250 mL infusion  0.1-1.5 mcg/kg/hr IntraVENous Continuous Ghanshyam Foss MD   Stopped at 03/12/23 0859    norepinephrine (LEVOPHED) 16 mg in sodium chloride 0.9 % 250 mL infusion  1-100 mcg/min IntraVENous Continuous Nitza Santillan DO   Stopped at 03/12/23 0900    enoxaparin (LOVENOX) injection 90 mg  1 mg/kg SubCUTAneous Daily Nicocathio Bolamann-Roche APRN - CNP   90 mg at 03/12/23 0900    vancomycin (VANCOCIN) intermittent dosing (placeholder)   Other RX Placeholder Nicoletto Bolzan-Roche, APRN - CNP           ALLERGIES: Patient has no known allergies. Review of Systems   Unable to perform ROS: Mental status change       VITALS:  Blood pressure 133/62, pulse 73, temperature 99.5 °F (37.5 °C), temperature source Bladder, resp. rate 24, height 6' (1.829 m), weight 195 lb 8.8 oz (88.7 kg), SpO2 96 %. Body mass index is 26.52 kg/m². Physical Exam  Vitals and nursing note reviewed. Constitutional:       Appearance: He is well-developed. He is not diaphoretic. HENT:      Head: Normocephalic and atraumatic. Eyes:      General: No scleral icterus. Conjunctiva/sclera: Conjunctivae normal.   Neck:      Thyroid: No thyromegaly. Vascular: No JVD. Trachea: No tracheal deviation. Cardiovascular:      Rate and Rhythm: Normal rate and regular rhythm. Chest Wall: PMI is not displaced. Pulses: Intact distal pulses. Heart sounds: Normal heart sounds. Heart sounds not distant. No murmur heard. No friction rub. No gallop. No S3 sounds. Pulmonary:      Effort: No respiratory distress. Breath sounds: No wheezing or rales. Chest:      Chest wall: No tenderness. Abdominal:      General: Bowel sounds are normal. There is no distension. Palpations: Abdomen is soft. There is no mass. Tenderness: There is no abdominal tenderness.  There is no guarding or rebound. Musculoskeletal:         General: Normal range of motion. Cervical back: Neck supple. No tenderness. Skin:     General: Skin is warm and dry. Coloration: Skin is not pale. Findings: No erythema or rash. Neurological:      Mental Status: He is alert. He is disoriented. Cranial Nerves: No cranial nerve deficit.        LABS:  Recent Results (from the past 24 hour(s))   Troponin    Collection Time: 03/11/23  9:18 AM   Result Value Ref Range    Troponin 0.060 (HH) 0.000 - 0.010 ng/mL   CK    Collection Time: 03/11/23  9:19 AM   Result Value Ref Range    Total CK 1,850 (H) 0 - 190 U/L   POCT Glucose    Collection Time: 03/11/23 12:03 PM   Result Value Ref Range    POC Glucose 128 (H) 70 - 99 mg/dl    Performed on ACCU-CHEK    CK    Collection Time: 03/11/23  2:20 PM   Result Value Ref Range    Total CK 1,413 (H) 0 - 190 U/L   POCT Glucose    Collection Time: 03/11/23  3:55 PM   Result Value Ref Range    POC Glucose 169 (H) 70 - 99 mg/dl    Performed on ACCU-CHEK    Vancomycin Level, Random    Collection Time: 03/12/23 12:00 AM   Result Value Ref Range    Vancomycin Rm 10.0 10.0 - 40.0 ug/mL   Comprehensive Metabolic Panel    Collection Time: 03/12/23  6:00 AM   Result Value Ref Range    Sodium 155 (H) 135 - 144 mEq/L    Potassium 3.2 (L) 3.4 - 4.9 mEq/L    Chloride 121 (H) 95 - 107 mEq/L    CO2 21 20 - 31 mEq/L    Anion Gap 13 9 - 15 mEq/L    Glucose 218 (H) 70 - 99 mg/dL    BUN 92 (HH) 8 - 23 mg/dL    Creatinine 3.28 (H) 0.70 - 1.20 mg/dL    Est, Glom Filt Rate 17.7 (L) >60    Calcium 7.8 (L) 8.5 - 9.9 mg/dL    Total Protein 4.6 (L) 6.3 - 8.0 g/dL    Albumin 2.1 (L) 3.5 - 4.6 g/dL    Total Bilirubin 1.3 (H) 0.2 - 0.7 mg/dL    Alkaline Phosphatase 82 35 - 104 U/L     (H) 0 - 41 U/L    AST 90 (H) 0 - 40 U/L    Globulin 2.5 2.3 - 3.5 g/dL   Magnesium    Collection Time: 03/12/23  6:00 AM   Result Value Ref Range    Magnesium 2.3 1.7 - 2.4 mg/dL   CBC with Auto Differential    Collection Time: 03/12/23  6:00 AM   Result Value Ref Range    WBC 8.9 4.8 - 10.8 K/uL    RBC 3.20 (L) 4.70 - 6.10 M/uL    Hemoglobin 10.1 (L) 14.0 - 18.0 g/dL    Hematocrit 30.0 (L) 42.0 - 52.0 %    MCV 93.7 (H) 79.0 - 92.2 fL    MCH 31.6 (H) 27.0 - 31.3 pg    MCHC 33.7 33.0 - 37.0 %    RDW 14.5 11.5 - 14.5 %    Platelets 82 (L) 795 - 400 K/uL    Neutrophils % 90.0 %    Lymphocytes % 4.9 %    Monocytes % 5.0 %    Eosinophils % 0.0 %    Basophils % 0.1 %    Neutrophils Absolute 8.0 (H) 1.4 - 6.5 K/uL    Lymphocytes Absolute 0.4 (L) 1.0 - 4.8 K/uL    Monocytes Absolute 0.4 0.2 - 0.8 K/uL    Eosinophils Absolute 0.0 0.0 - 0.7 K/uL    Basophils Absolute 0.0 0.0 - 0.2 K/uL     Troponin:   Lab Results   Component Value Date/Time    TROPONINI 0.060 03/11/2023 09:18 AM       EKG: normal sinus rhythm, baseline artifact      ASSESSMENT:    Septic shock-on pressors  Rhabdomyolysis  Elevated cardiac enzyme-demand ischemia  Acute kidney injury  Electrolyte abnormality-hyperkalemia  Mild aortic stenosis  Aneurysmal ascending aorta measuring 4.2 cm  Primary hypertension  Hyperlipidemia  History of deep vein thrombosis. PLAN:   As always, aggressive risk factor modification is strongly recommended. We should adhere to the JNC VIII guidelines for HTN management and the NCEPATP III guidelines for LDL-C management  ICU supportive care and management  IV fluids  Avoid nephrotoxic agents  Monitor on telemetry  Wean pressors off as tolerated  Maximize cardiac medications. Cardioprotective medications when blood pressure tolerates/off of pressors  Continue with full dose Lovenox for now given history of chronic DVT. Transition back to oral anticoagulation prior to discharge  Sepsis treatment per primary care team/intensivist  Consider coronary valuation based on clinical course. Patient with multiple risk factors for CAD.   No need for repeat echocardiogram.  Recent echo in December 2023 with normal LV function mild aortic stenosis  GI/DVT prophylaxis  Maintain potassium between 4-5 magnesium greater than 2  Further recommendations to follow      Thank you for allowing me to participate in the care of your patient, please don't hesitate to contact me if you have any further questions. Greater than 30 minutes critical care time spent.     Electronically signed by Flex Cueto DO on 3/12/2023 at 10:09 AM

## 2023-03-12 NOTE — PROGRESS NOTES
[id number]: Arrived to the ICU at [facility] and Met with Bedside nurse Maris WATTS and received completed SBAR, Levophed was shut off yesterday 3/11/23 in the evening. Sharma Brittle son at bedside, Patient very agitated, anxious, and short of breath at times. [id number]: Met with Son Jasvir Rebolledo in conference room, because patient Sharma Brittle was becoming agitated, and was disoriented. Writer spoke with Jasvir Rebolledo. Hospice Philosophy, Medicare Hospice Election/ Medicare Benefit Periods, Discontinuing Hospice Care/Right to Revoke, Hospice Freedom of Choice, Right to Choose an attending physician, levels of care, general criteria/appropriateness for hospice, 24/7 on-call & Care Team, Hospice Patient Information Booklet reviewed during referral visit. Encouraged reading of [facility], and pages 4, 7, 9, 15, 51 and 61 of Hospice Patient Information Booklet reviewed. Medication disposal page 48 discussed. Copies of Hospice Patient 3 [facility] provided in [employer]. Reviewed Hospice services and 24/7 Hospice number [phone] to call for any questions or concerns. Son Sharma Brittle is expressing that he wishes to hold off at this time with signing consents, despite writers efforts to explain symptom management and inpatient status, Jasvir Rebolledo feels his Dad Sharma Brittle has shown improvement and would like to wait until Tuesday and re-meet with Hospice services. He also stated he has a meeting tomorrow 3/13 with a , and his own Dr Appointment he needed to attend on 3/13 but was willing to re meet for services on Tuesday. [id number]: Co-worker updated on plan for patient as he is not signing consents at this time. [id number]: Page Janis Segovia to Dr. Thierno Sprague for Diagnosis and approval for hospice services, to have in place for Tuesday. [id number]: Spoke Directly with Dr. Thierno Sprague and gave completed SBAR, including labs assessments.  Gave update on Son wishes for Hospice but wanting to see how is fathers condition progressed in next 24 hours. Would like to re-meet Tuesday, Diagnosis and Verbal Cert received. Approved for Hospice services.

## 2023-03-12 NOTE — PROGRESS NOTES
Progress Note  Date:3/12/2023       Room:[facility]  Patient Name:Keegan Godoy     YOB: 1940     Age:83 y.o. Subjective      No acute events overnight. Patient remains delirious, unable to redirect. Family met with hospice, will likely pursue hospice measures, will re-meet with Hospice early next week. Plan to transfer from ICU out to medical floor later today. Objective         Vitals Last 24 Hours:  TEMPERATURE:  Temp  Av.2 °F (37.3 °C)  Min: 98.8 °F (37.1 °C)  Max: 99.5 °F (37.5 °C)  RESPIRATIONS RANGE: Resp  Av.3  Min: 15  Max: 31  PULSE OXIMETRY RANGE: SpO2  Av.5 %  Min: 96 %  Max: 97 %  PULSE RANGE: Pulse  Av.3  Min: 56  Max: 111  BLOOD PRESSURE RANGE: Systolic (96OIG), YLK:793 , Min:113 , FBM:630   ; Diastolic (19LIB), QGN:78, Min:48, Max:74    I/O (24Hr): Intake/Output Summary (Last 24 hours) at 3/12/2023 0911  Last data filed at 3/12/2023 0283  Gross per 24 hour   Intake 2522. 85 ml   Output 1075 ml   Net 1447.85 ml       Constitutional: Delirious critically ill adult male reclining in ICU bed appearing restless and confused but in no acute physical distress. Head: NCAT  Eyes: PERRLA, EOMI  ENT: Hearing appears grossly intact. Neck: Supple. Trachea midline, speech slurred and mostly unintelligible  Cardiovascular: RRR. No significant murmurs appreciated. No significant pretibial edema. Pulmonary: Normal rate and effort of respiration on O2 by NC. Grossly CTAB. No coughing. Abdomen: Soft, nontense, nondistended. Bowel sounds appreciated. Seemingly nontender to palpation. Neurologic: Awake, grossly disoriented. Generalized weakness. Moves head and arms. Doesn't follow most commands. Dysarthric speech. Psychiatric: Restless, disoriented. MSK/integumentary: Scattered small ecchymoses on extensor surfaces of forearms, on left inframammary rib angle, and on posterior left shoulder.   No gross bony abnormalities buzz. Labs/Imaging/Diagnostics    Labs:  CBC:  Recent Labs     03/10/23  1430 03/10/23  2002 03/11/23  0345 03/12/23  0600   WBC 12.9*  --  8.7 8.9   RBC 4.23*  --  3.65* 3.20*   HGB 13.6* 10.3* 11.6* 10.1*   HCT 41.0*  --  34.5* 30.0*   MCV 96.9*  --  94.4* 93.7*   RDW 14.3  --  14.2 14.5   *  --  99* 82*       CHEMISTRIES:  Recent Labs     03/10/23  1430 03/10/23  1508 03/10/23  1509 03/10/23  2002 03/11/23  0345 03/12/23  0600   *  --   --   --  150* 155*   K 3.6  --   --   --  3.0* 3.2*     --   --   --  114* 121*   CO2 19*  --   --   --  19* 21   BUN 92*  --   --   --  92* 92*   CREATININE 2.63*   < >  --  2.8* 2.90* 3.28*   GLUCOSE 149*  --  116  --  112* 218*   MG 3.0*  --   --   --  2.3 2.3    < > = values in this interval not displayed. PT/INR:  Recent Labs     03/10/23  1430   PROTIME 15.1*   INR 1.2       APTT:  Recent Labs     03/10/23  1430   APTT 28.2       LIVER PROFILE:  Recent Labs     03/10/23  1430 03/11/23  0037 03/11/23 0345 03/12/23  0600   *  --  146* 90*   *  --  291* 198*   BILIDIR  --  1.3*  --   --    BILITOT 2.3* 2.0* 1.9* 1.3*   ALKPHOS 132*  --  99 82         Imaging Last 24 Hours:  CT ABDOMEN PELVIS WO CONTRAST Additional Contrast? None    Result Date: 3/10/2023  EXAMINATION: CT OF THE ABDOMEN AND PELVIS WITHOUT CONTRAST 3/10/2023 4:54 pm TECHNIQUE: CT of the abdomen and pelvis was performed without the administration of intravenous contrast. Multiplanar reformatted images are provided for review. Automated exposure control, iterative reconstruction, and/or weight based adjustment of the mA/kV was utilized to reduce the radiation dose to as low as reasonably achievable. COMPARISON: None.  HISTORY: ORDERING SYSTEM PROVIDED HISTORY: fall TECHNOLOGIST PROVIDED HISTORY: Reason for exam:->fall Additional Contrast?->None Decision Support Exception - unselect if not a suspected or confirmed emergency medical condition->Emergency Medical Condition (MA) What reading provider will be dictating this exam?->CRC FINDINGS: Lower Chest: Left lower lobe opacity consistent with atelectasis although pneumonia not totally excludable. Organs: The liver, spleen, adrenal glands, pancreas are normal.  Gallbladder is mildly distended with gallbladder sludge. Right renal atrophy. GI/Bowel: Diffuse colonic fecal retention. Normal small bowel and appendix. Pelvis: Prostatomegaly. Arce catheter in a decompressed urinary bladder. Peritoneum/Retroperitoneum: No free fluid or free air. Bones/Soft Tissues:   Right inguinal hernia containing normal loops of bowel. Degenerative changes lumbar spine. No fractures. Atraumatic appearance of the abdomen and pelvis. Prostatomegaly. Right renal atrophy. XR FEMUR RIGHT (MIN 2 VIEWS)    Result Date: 3/10/2023  EXAMINATION: XRAY VIEWS OF THE RIGHT FEMUR 3/10/2023 5:08 pm COMPARISON: None. HISTORY: ORDERING SYSTEM PROVIDED HISTORY: fall TECHNOLOGIST PROVIDED HISTORY: Reason for exam:->fall What reading provider will be dictating this exam?->CRC FINDINGS: There is no evidence of acute fracture. There is normal alignment. No acute joint abnormality. No focal osseous lesion. No focal soft tissue abnormality. No acute osseous abnormality. CT Head W/O Contrast    Result Date: 3/10/2023  EXAMINATION: CT OF THE HEAD WITHOUT CONTRAST; CT OF THE CERVICAL SPINE WITHOUT CONTRAST 3/10/2023 4:54 pm TECHNIQUE: CT of the head was performed without the administration of intravenous contrast. Automated exposure control, iterative reconstruction, and/or weight based adjustment of the mA/kV was utilized to reduce the radiation dose to as low as reasonably achievable.; CT of the cervical spine was performed without the administration of intravenous contrast. Multiplanar reformatted images are provided for review.  Automated exposure control, iterative reconstruction, and/or weight based adjustment of the mA/kV was utilized to reduce the radiation dose to as low as reasonably achievable. COMPARISON: None. HISTORY: ORDERING SYSTEM PROVIDED HISTORY: fall, confusion TECHNOLOGIST PROVIDED HISTORY: Reason for exam:->fall, confusion Has a \"code stroke\" or \"stroke alert\" been called? ->No Decision Support Exception - unselect if not a suspected or confirmed emergency medical condition->Emergency Medical Condition (MA) What reading provider will be dictating this exam?->CRC FINDINGS: CT OF THE BRAIN: BRAIN/VENTRICLES: No mass effect, edema or hemorrhage is seen. Mild volume loss is seen in the cerebrum with mild chronic microvascular ischemic changes. No hydrocephalus or extra-axial fluid is seen. ORBITS: Prosthetic lenses are seen in the globes bilaterally. The orbits are otherwise grossly unremarkable. The the SINUSES: Mild mucosal thickening is seen in the paranasal sinuses. Small effusion in the right mastoid air cells. The left mastoid air cells are clear. SOFT TISSUES/SKULL: No acute abnormality of the visualized skull or soft tissues. CT CERVICAL SPINE: BONES/ALIGNMENT: No fracture or joint dislocation is seen in the cervical spine. Mild age indeterminate compression fracture deformity along the superior endplate of the T1 vertebral body. DEGENERATIVE CHANGES: Moderate loss of disc heights at C5-6 and C6-7. Mild central canal scratch the small disc bulge results in mild central canal stenosis at C3-4. Multilevel facet and uncovertebral joint hypertrophic changes resulting in variable degrees of neural foraminal stenoses, worst (moderate to severe) at the left C3-4 level. SOFT TISSUES: There is no prevertebral soft tissue swelling. CT HEAD WITHOUT CONTRAST: 1. No skull fracture or acute intracranial abnormality. CT CERVICAL SPINE WITHOUT CONTRAST: 1. No fracture or joint dislocation is seen in the cervical spine. 2. Mild age indeterminate compression fracture deformity in the T1 vertebral body.    If indicated, MRI may be obtained for further evaluation. 3. Degenerative changes, as described. CT CHEST WO CONTRAST    Result Date: 3/10/2023  EXAMINATION: CT OF THE CHEST WITHOUT CONTRAST 3/10/2023 4:54 pm TECHNIQUE: CT of the chest was performed without the administration of intravenous contrast. Multiplanar reformatted images are provided for review. Automated exposure control, iterative reconstruction, and/or weight based adjustment of the mA/kV was utilized to reduce the radiation dose to as low as reasonably achievable. COMPARISON: None. HISTORY: ORDERING SYSTEM PROVIDED HISTORY: fall TECHNOLOGIST PROVIDED HISTORY: Reason for exam:->fall Decision Support Exception - unselect if not a suspected or confirmed emergency medical condition->Emergency Medical Condition (MA) What reading provider will be dictating this exam?->CRC FINDINGS: Mediastinum: Thoracic aorta is unremarkable. Heart and pericardium are normal.  Calcified plaque involving the coronary arteries. No significant mediastinal adenopathy. Lungs/pleura: Patchy right upper lobe lung opacities consistent with pneumonia. Bibasilar atelectasis. Upper Abdomen:   Gallbladder sludge. Soft Tissues/Bones: Fractures. Atraumatic appearance of the chest. Patchy right upper lobe opacities consistent with pneumonia. CT CERVICAL SPINE WO CONTRAST    Result Date: 3/10/2023  EXAMINATION: CT OF THE HEAD WITHOUT CONTRAST; CT OF THE CERVICAL SPINE WITHOUT CONTRAST 3/10/2023 4:54 pm TECHNIQUE: CT of the head was performed without the administration of intravenous contrast. Automated exposure control, iterative reconstruction, and/or weight based adjustment of the mA/kV was utilized to reduce the radiation dose to as low as reasonably achievable.; CT of the cervical spine was performed without the administration of intravenous contrast. Multiplanar reformatted images are provided for review.  Automated exposure control, iterative reconstruction, and/or weight based adjustment of the mA/kV was utilized to reduce the radiation dose to as low as reasonably achievable. COMPARISON: None. HISTORY: ORDERING SYSTEM PROVIDED HISTORY: fall, confusion TECHNOLOGIST PROVIDED HISTORY: Reason for exam:->fall, confusion Has a \"code stroke\" or \"stroke alert\" been called?->No Decision Support Exception - unselect if not a suspected or confirmed emergency medical condition->Emergency Medical Condition (MA) What reading provider will be dictating this exam?->CRC FINDINGS: CT OF THE BRAIN: BRAIN/VENTRICLES: No mass effect, edema or hemorrhage is seen.  Mild volume loss is seen in the cerebrum with mild chronic microvascular ischemic changes.  No hydrocephalus or extra-axial fluid is seen. ORBITS: Prosthetic lenses are seen in the globes bilaterally.  The orbits are otherwise grossly unremarkable.  The the SINUSES: Mild mucosal thickening is seen in the paranasal sinuses.  Small effusion in the right mastoid air cells.  The left mastoid air cells are clear. SOFT TISSUES/SKULL: No acute abnormality of the visualized skull or soft tissues. CT CERVICAL SPINE: BONES/ALIGNMENT: No fracture or joint dislocation is seen in the cervical spine.  Mild age indeterminate compression fracture deformity along the superior endplate of the T1 vertebral body. DEGENERATIVE CHANGES: Moderate loss of disc heights at C5-6 and C6-7.  Mild central canal scratch the small disc bulge results in mild central canal stenosis at C3-4. Multilevel facet and uncovertebral joint hypertrophic changes resulting in variable degrees of neural foraminal stenoses, worst (moderate to severe) at the left C3-4 level. SOFT TISSUES: There is no prevertebral soft tissue swelling.     CT HEAD WITHOUT CONTRAST: 1.  No skull fracture or acute intracranial abnormality. CT CERVICAL SPINE WITHOUT CONTRAST: 1.  No fracture or joint dislocation is seen in the cervical spine. 2. Mild age indeterminate compression fracture deformity in the T1 vertebral body.   If  indicated, MRI may be obtained for further evaluation. 3. Degenerative changes, as described. CT THORACIC SPINE WO CONTRAST    Result Date: 3/10/2023  EXAMINATION: CT OF THE THORACIC SPINE WITHOUT CONTRAST  3/10/2023 4:54 pm: TECHNIQUE: CT of the thoracic spine was performed without the administration of intravenous contrast. Multiplanar reformatted images are provided for review. Automated exposure control, iterative reconstruction, and/or weight based adjustment of the mA/kV was utilized to reduce the radiation dose to as low as reasonably achievable. COMPARISON: None. HISTORY: ORDERING SYSTEM PROVIDED HISTORY: fall confusion TECHNOLOGIST PROVIDED HISTORY: Reason for exam:->fall confusion What reading provider will be dictating this exam?->CRC FINDINGS: BONES/ALIGNMENT: There is normal alignment of the spine. The vertebral body heights are maintained. No osseous destructive lesion is seen. DEGENERATIVE CHANGES: Multilevel degenerative changes. SOFT TISSUES: No paraspinal mass is seen. MISCELLANEOUS: Bilateral pulmonary opacities. No evidence of acute thoracic spine trauma. CT LUMBAR SPINE WO CONTRAST    Result Date: 3/10/2023  EXAMINATION: CT OF THE LUMBAR SPINE WITHOUT CONTRAST  3/10/2023 TECHNIQUE: CT of the lumbar spine was performed without the administration of intravenous contrast. Multiplanar reformatted images are provided for review. Adjustment of mA and/or kV according to patient size was utilized. Automated exposure control, iterative reconstruction, and/or weight based adjustment of the mA/kV was utilized to reduce the radiation dose to as low as reasonably achievable.  COMPARISON: None HISTORY: ORDERING SYSTEM PROVIDED HISTORY: fall confusion TECHNOLOGIST PROVIDED HISTORY: Reason for exam:->fall confusion Decision Support Exception - unselect if not a suspected or confirmed emergency medical condition->Emergency Medical Condition (MA) What reading provider will be dictating this exam?->CRC FINDINGS: BONES/ALIGNMENT: There is normal alignment of the spine. The vertebral body heights are maintained. No osseous destructive lesion is seen. DEGENERATIVE CHANGES: Multilevel degenerative changes there is is SOFT TISSUES/RETROPERITONEUM: No paraspinal mass is seen. No evidence of acute lumbar spine trauma. Multilevel degenerative changes. XR CHEST PORTABLE    Result Date: 3/10/2023  EXAMINATION: ONE XRAY VIEW OF THE CHEST 3/10/2023 7:13 pm COMPARISON: None. HISTORY: ORDERING SYSTEM PROVIDED HISTORY: central line TECHNOLOGIST PROVIDED HISTORY: Reason for exam:->central line What reading provider will be dictating this exam?->CRC FINDINGS: The lungs are without acute focal process. There is no effusion or pneumothorax. The cardiomediastinal silhouette is without acute process. The osseous structures are without acute process. Right internal jugular line tip in the proximal SVC. Right internal jugular line tip in the proximal SVC. No pneumothorax. XR HIP 2-3 VW W PELVIS RIGHT    Result Date: 3/10/2023  EXAMINATION: ONE XRAY VIEW OF THE PELVIS AND TWO XRAY VIEWS RIGHT HIP 3/10/2023 5:08 pm COMPARISON: None. HISTORY: ORDERING SYSTEM PROVIDED HISTORY: fall TECHNOLOGIST PROVIDED HISTORY: Reason for exam:->fall What reading provider will be dictating this exam?->CRC FINDINGS: The bones are demineralized. No evidence of pelvic fracture. Bilateral hips demonstrate normal alignment. No focal osseous lesion. SI joints are symmetric. Arce catheter in place. No acute abnormality of the pelvis and right hip. Assessment//Plan           Hospital Problems             Last Modified POA    * (Principal) Shock ([address] 3/10/2023 Yes   Assessment & Plan    Shock: Hypotension and hypothermic after possible fall at home. Temp 88.5 and BP 78/47 on arrival to ED. Dehydration and LEORA with elevated CK, and elevated liver enzymes. Lactic acid elevated patient given 4 L NS in ED. Altered mental status.   WBC 12.9.  Procalcitonin 1.90. Patient placed on Patrick hugger. Patient requiring pressors. Central line placed in ED. UA negative. Blood culture sent x2. We will monitor closely in ICU setting. We will continue pressors and fluid resuscitation. We will continue warming with Patrick hugger and monitor closely. Sepsis of unknown etiology chest x-ray suggestive of possible pneumonia versus aspiration pneumonia. We will get respiratory panel. We will empirically treat now with Unasyn, vancomycin, and Levaquin. 3/11: Antibiotic coverage narrowed to vancomycin and Zosyn. No focal source of infection identified, shock now suspected to be multifactorial instead of septic, with likely contributing factors of hypovolemia and possibly cardiogenic. Cannot yet definitively rule out septic shock but no obvious source of infection at present. Remains on Levophed at 10 mcg for vasopressor support. 3/12: Improved, off vasopressors. Rhabdomyolysis: Total CK 2753. Likely due to fall and being down for as much is 4 days. Patient given 4 L NS in ED. We will continue IV fluid resuscitation. We will monitor CK every 6 hours. 3/11: Creatinine improving, most recent value 1850  3/12: Improved     LEORA on CKD3: Dehydration. Creatinine 2.63. Baseline around 1.2. Likely due to poor oral intake for extended period of time. Patient given 4 L NS in ED. We will strictly monitor inputs and outputs and continue IV fluid resuscitation. Will monitor for signs of urine retention  3/11: Renal function slightly worse, creatinine 2.90  3/12: Continues to worsen     Elevated troponin: Troponin 0.020. EKG shows sinus without ST elevation. Likely due to LEORA and ischemia due to hypoperfusion. We will cycle troponin and repeat EKG. We will keep patient on telemetry monitoring. If needed, we will consult cardiology. 3/11: Troponins slowly worsening, most recent value 0.060.   Unclear if actual cardiac ischemia versus impaired clearance with simultaneous worsening renal function. Continuing to trend. Cardiology consulted. Elevated LFTs: Alk phos 132, , , bilirubin 2.3. Ammonia 45. Likely shock liver. Patient received 4 L NS in ED and placed on pressors. We will repeat CMP now and monitor with CMP daily. We will continue IV fluid resuscitation. If no improvement, we will consult GI     Elevated lactic acid: Lactic acid 4.4. Patient given 4 L NS in ED. Repeat lactic acid 2.0. We will continue to trend lactic acid. 3/11: Lactate elevation resolved with IVF resuscitation and BP normalization of vasopressors. Hypothermia: Temp 88.5. Patient currently on [employer]. Exposure versus sepsis. We will continue to monitor closely with indwelling catheter  3/11: Remains hypothermic, continues on external warming blanket. 3/12: Improved     AMS: Fall at home. Typically A&O x3. Likely due to sepsis shock. Head CT negative. We will continue to monitor. If needed, we will consult neurology. 3/11: Persistent encephalopathy, suspected multifactorial.  Monitoring. 3/12: Remains encephalopathic     Active problems:  History of DVT: Patient on [address] [place] [address] Road. Likely has not taken in the last 4 days and cannot take oral medications at this time. We will administer full dose Lovenox daily given renal function. We will resume home meds when normal diet is resumed. HTN: Patient on home meds to control. We will hold home meds until blood pressure stabilized. Patient currently requiring pressors for hypotension. HLD: Patient on statin at home. We will resume with normal diet  Hypothyroidism: Patient on Synthroid. We will resume with normal diet. HF: EF of 58%. Patient on beta-blocker. We will monitor fluid status closely and resume with normal diet      Disposition: Patient family met with hospice 3/12, deciding whether or not to proceed with hospice measures.   Family planning to meet with hospice service again early next week, will make decision at that time pending patient's condition changes in the interim. Plan for stepdown from ICU to medical floor later today.       Electronically signed by Houston Prader, DO on 3/12/23

## 2023-03-12 NOTE — PLAN OF CARE
Problem: Discharge Planning  Goal: Discharge to home or other facility with appropriate resources  Outcome: Progressing  Flowsheets (Taken 3/11/2023 2100)  Discharge to home or other facility with appropriate resources:   Identify barriers to discharge with patient and caregiver   Arrange for needed discharge resources and transportation as appropriate   Identify discharge learning needs (meds, wound care, etc)   Arrange for interpreters to assist at discharge as needed   Refer to discharge planning if patient needs post-hospital services based on physician order or complex needs related to functional status, cognitive ability or social support system     Problem: Skin/Tissue Integrity  Goal: Absence of new skin breakdown  Outcome: Progressing     Problem: ABCDS Injury Assessment  Goal: Absence of physical injury  Outcome: Progressing     Problem: Safety - Adult  Goal: Free from fall injury  Outcome: Progressing     Problem: Pain  Goal: Verbalizes/displays adequate comfort level or baseline comfort level  Outcome: Progressing     Problem: Chronic Conditions and Co-morbidities  Goal: Patient's chronic conditions and co-morbidity symptoms are monitored and maintained or improved  Outcome: Progressing  Flowsheets (Taken 3/11/2023 2100)  Care Plan - Patient's Chronic Conditions and Co-Morbidity Symptoms are Monitored and Maintained or Improved:   Monitor and assess patient's chronic conditions and comorbid symptoms for stability, deterioration, or improvement   Collaborate with multidisciplinary team to address chronic and comorbid conditions and prevent exacerbation or deterioration   Update acute care plan with appropriate goals if chronic or comorbid symptoms are exacerbated and prevent overall improvement and discharge

## 2023-03-12 NOTE — CONSULTS
[facility]  [facility]ult [facility]           Reason for Consult: Hypernatremia, acute kidney injury/ATN  Requesting Physician:  Dr. Humberto Vargas    Chief Complaint: Altered mental status, hypotension, hypothermia  History Obtained From:  patient, electronic medical record    History of Present Ilness:    78y.o. year old male admitted with possible fall at home found to be hypotensive with hypothermia. Reviewed his [facility] tab [employer] [employer] clinic labs in care everywhere and baseline creatinine is around 1.2. Patient lives by himself and manages taking care of all his ADLs. Family had not heard from him from a few days. They were unable to get into the house so they called rescue to open the house and check on him. Was found on the floor unable to get up. Last day they had heard from him was Monday. Past medical history is significant for hypertension, history of DVT ascending aortic aneurysm as well as aortic stenosis. Continued on metoprolol Dyazide and Eliquis. Evaluated by trauma in the emergency room and cleared from a trauma standpoint. Was admitted to the ICU with hypotension. Got 4 L of fluids. Sodium levels at 155 creatinine is at 3.2 elevated lactic acid level . Currently not on pressors. Making urine but it is dark and concentrated    Past Medical History:        Diagnosis Date    Ascending aorta dilatation (HCC)     Basal cell carcinoma     CKD (chronic kidney disease)     DVT (deep venous thrombosis) (HCC)        Past Surgical History:    History reviewed. No pertinent surgical history. Home Medications:    No current facility-administered medications on file prior to encounter.      Current Outpatient Medications on File Prior to Encounter   Medication Sig Dispense Refill    triamterene-hydroCHLOROthiazide (DYAZIDE) 37.5-25 MG per capsule TAKE 1 CAPSULE BY MOUTH EVERY DAY      pravastatin (PRAVACHOL) 20 MG tablet TAKE 1 TABLET BY MOUTH EVERYDAY AT BEDTIME (Patient not taking: Reported on 3/11/2023) metoprolol succinate (TOPROL XL) 25 MG extended release tablet Take by mouth daily      levothyroxine (SYNTHROID) 125 MCG tablet TAKE 1 TABLET BY MOUTH EVERY DAY      cyanocobalamin 1000 MCG/ML injection Inject into the muscle (Patient not taking: Reported on 3/11/2023)      aspirin 81 MG EC tablet Take by mouth daily (Patient not taking: Reported on 3/11/2023)      pravastatin (PRAVACHOL) 20 MG tablet TAKE 1 TABLET BY MOUTH EVERYDAY AT BEDTIME      ELIQUIS 5 MG TABS tablet TAKE 1 TABLET BY MOUTH TWICE A DAY         Allergies:  Patient has no known allergies. Social History:    Social History     Socioeconomic History    Marital status:      Spouse name: Not on file    Number of children: Not on file    Years of education: Not on file    Highest education level: Not on file   Occupational History    Not on file   Tobacco Use    Smoking status: Former     Types: Cigarettes     Passive exposure: Past    Smokeless tobacco: Never   Substance and Sexual Activity    Alcohol use: Yes     Alcohol/week: 14.0 standard drinks     Types: 14 Cans of beer per week     Comment: 1-2 regular cans of beer a day    Drug use: Never    Sexual activity: Never   Other Topics Concern    Not on file   Social History Narrative    Not on file     Social Determinants of Health     Financial Resource Strain: Not on file   Food Insecurity: Not on file   Transportation Needs: Not on file   Physical Activity: Not on file   Stress: Not on file   Social Connections: Not on file   Intimate Partner Violence: Not on file   Housing Stability: Not on file       Family History:   History reviewed. No pertinent family history. Review of Systems:   Review of Systems   Constitutional:  Positive for activity change. HENT:  Negative for congestion. Eyes:  Negative for discharge. Respiratory:  Positive for shortness of breath. Cardiovascular:  Negative for leg swelling. Gastrointestinal:  Negative for abdominal distention.    Endocrine: Negative for cold intolerance. Genitourinary:  Negative for difficulty urinating. Musculoskeletal:  Negative for arthralgias. Neurological:  Negative for dizziness. Hematological:  Negative for adenopathy. Psychiatric/Behavioral:  Positive for confusion. Physical exam:   Constitutional:  VITALS:  /74   Pulse 70   Temp 97.7 °F (36.5 °C) (Bladder)   Resp 22   Ht 6' (1.829 m)   Wt 195 lb 8.8 oz (88.7 kg)   SpO2 96%   BMI 26.52 kg/m²   Gen: Ill-appearing lying in bed, confused  Skin: Poor skin turgor  Heent:  eomi, mmm  Neck: no bruits or jvd noted, thyroid normal  Lungs:  Normal expansion. Clear to auscultation. No rales, rhonchi, or wheezing. Heart:  Heart sounds are normal.  Regular rate and rhythm without murmur, gallop or rub. Abdomen:  +bs, soft, nt, nd, no hepatosplenomegaly   Extremities: Extremities warm to touch, pink, with no edema.   Psychiatric: Unable to assess  Musculoskeletal:  Rom, muscular strength intact; digits, nails normal    Data/  CBC:   Lab Results   Component Value Date/Time    WBC 8.9 03/12/2023 06:00 AM    RBC 3.20 03/12/2023 06:00 AM    HGB 10.1 03/12/2023 06:00 AM    HCT 30.0 03/12/2023 06:00 AM    MCV 93.7 03/12/2023 06:00 AM    MCH 31.6 03/12/2023 06:00 AM    MCHC 33.7 03/12/2023 06:00 AM    RDW 14.5 03/12/2023 06:00 AM    PLT 82 03/12/2023 06:00 AM     BMP:    Lab Results   Component Value Date/Time     03/12/2023 06:00 AM    K 3.2 03/12/2023 06:00 AM     03/12/2023 06:00 AM    CO2 21 03/12/2023 06:00 AM    BUN 92 03/12/2023 06:00 AM    LABALBU 2.1 03/12/2023 06:00 AM    CREATININE 3.28 03/12/2023 06:00 AM    CALCIUM 7.8 03/12/2023 06:00 AM    LABGLOM 17.7 03/12/2023 06:00 AM    GLUCOSE 218 03/12/2023 06:00 AM     CT ABDOMEN PELVIS WO CONTRAST Additional Contrast? None    Result Date: 3/10/2023  EXAMINATION: CT OF THE ABDOMEN AND PELVIS WITHOUT CONTRAST 3/10/2023 4:54 pm TECHNIQUE: CT of the abdomen and pelvis was performed without the administration of intravenous contrast. Multiplanar reformatted images are provided for review. Automated exposure control, iterative reconstruction, and/or weight based adjustment of the mA/kV was utilized to reduce the radiation dose to as low as reasonably achievable. COMPARISON: None. HISTORY: ORDERING SYSTEM PROVIDED HISTORY: fall TECHNOLOGIST PROVIDED HISTORY: Reason for exam:->fall Additional Contrast?->None Decision Support Exception - unselect if not a suspected or confirmed emergency medical condition->Emergency Medical Condition ([place]) What reading provider will be dictating this exam?->CRC FINDINGS: Lower Chest: Left lower lobe opacity consistent with atelectasis although pneumonia not totally excludable. Organs: The liver, spleen, adrenal glands, pancreas are normal.  Gallbladder is mildly distended with gallbladder sludge.  Right renal atrophy. GI/Bowel: Diffuse colonic fecal retention.  Normal small bowel and appendix. Pelvis: Prostatomegaly.  Arce catheter in a decompressed urinary bladder. Peritoneum/Retroperitoneum: No free fluid or free air. Bones/Soft Tissues:   Right inguinal hernia containing normal loops of bowel. Degenerative changes lumbar spine.  No fractures.     Atraumatic appearance of the abdomen and pelvis. Prostatomegaly. Right renal atrophy.     XR FEMUR RIGHT (MIN 2 VIEWS)    Result Date: 3/10/2023  EXAMINATION: XRAY VIEWS OF THE RIGHT FEMUR 3/10/2023 5:08 pm COMPARISON: None. HISTORY: ORDERING SYSTEM PROVIDED HISTORY: fall TECHNOLOGIST PROVIDED HISTORY: Reason for exam:->fall What reading provider will be dictating this exam?->CRC FINDINGS: There is no evidence of acute fracture.  There is normal alignment.  No acute joint abnormality.  No focal osseous lesion. No focal soft tissue abnormality.     No acute osseous abnormality.     CT Head W/O Contrast    Result Date: 3/10/2023  EXAMINATION: CT OF THE HEAD WITHOUT CONTRAST; CT OF THE CERVICAL SPINE WITHOUT CONTRAST 3/10/2023 4:54 pm  TECHNIQUE: CT of the head was performed without the administration of intravenous contrast. Automated exposure control, iterative reconstruction, and/or weight based adjustment of the mA/kV was utilized to reduce the radiation dose to as low as reasonably achievable.; CT of the cervical spine was performed without the administration of intravenous contrast. Multiplanar reformatted images are provided for review. Automated exposure control, iterative reconstruction, and/or weight based adjustment of the mA/kV was utilized to reduce the radiation dose to as low as reasonably achievable. COMPARISON: None. HISTORY: ORDERING SYSTEM PROVIDED HISTORY: fall, confusion TECHNOLOGIST PROVIDED HISTORY: Reason for exam:->fall, confusion Has a \"code stroke\" or \"stroke alert\" been called? ->No Decision Support Exception - unselect if not a suspected or confirmed emergency medical condition->Emergency Medical Condition (MA) What reading provider will be dictating this exam?->CRC FINDINGS: CT OF THE BRAIN: BRAIN/VENTRICLES: No mass effect, edema or hemorrhage is seen. Mild volume loss is seen in the cerebrum with mild chronic microvascular ischemic changes. No hydrocephalus or extra-axial fluid is seen. ORBITS: Prosthetic lenses are seen in the globes bilaterally. The orbits are otherwise grossly unremarkable. The the SINUSES: Mild mucosal thickening is seen in the paranasal sinuses. Small effusion in the right mastoid air cells. The left mastoid air cells are clear. SOFT TISSUES/SKULL: No acute abnormality of the visualized skull or soft tissues. CT CERVICAL SPINE: BONES/ALIGNMENT: No fracture or joint dislocation is seen in the cervical spine. Mild age indeterminate compression fracture deformity along the superior endplate of the T1 vertebral body. DEGENERATIVE CHANGES: Moderate loss of disc heights at C5-6 and C6-7. Mild central canal scratch the small disc bulge results in mild central canal stenosis at C3-4.  Multilevel facet and uncovertebral joint hypertrophic changes resulting in variable degrees of neural foraminal stenoses, worst (moderate to severe) at the left C3-4 level. SOFT TISSUES: There is no prevertebral soft tissue swelling. CT HEAD WITHOUT CONTRAST: 1. No skull fracture or acute intracranial abnormality. CT CERVICAL SPINE WITHOUT CONTRAST: 1. No fracture or joint dislocation is seen in the cervical spine. 2. Mild age indeterminate compression fracture deformity in the T1 vertebral body. If indicated, MRI may be obtained for further evaluation. 3. Degenerative changes, as described. CT CHEST WO CONTRAST    Result Date: 3/10/2023  EXAMINATION: CT OF THE CHEST WITHOUT CONTRAST 3/10/2023 4:54 pm TECHNIQUE: CT of the chest was performed without the administration of intravenous contrast. Multiplanar reformatted images are provided for review. Automated exposure control, iterative reconstruction, and/or weight based adjustment of the mA/kV was utilized to reduce the radiation dose to as low as reasonably achievable. COMPARISON: None. HISTORY: ORDERING SYSTEM PROVIDED HISTORY: fall TECHNOLOGIST PROVIDED HISTORY: Reason for exam:->fall Decision Support Exception - unselect if not a suspected or confirmed emergency medical condition->Emergency Medical Condition (MA) What reading provider will be dictating this exam?->CRC FINDINGS: Mediastinum: Thoracic aorta is unremarkable. Heart and pericardium are normal.  Calcified plaque involving the coronary arteries. No significant mediastinal adenopathy. Lungs/pleura: Patchy right upper lobe lung opacities consistent with pneumonia. Bibasilar atelectasis. Upper Abdomen:   Gallbladder sludge. Soft Tissues/Bones: Fractures. Atraumatic appearance of the chest. Patchy right upper lobe opacities consistent with pneumonia.      CT CERVICAL SPINE WO CONTRAST    Result Date: 3/10/2023  EXAMINATION: CT OF THE HEAD WITHOUT CONTRAST; CT OF THE CERVICAL SPINE WITHOUT CONTRAST 3/10/2023 4:54 pm TECHNIQUE: CT of the head was performed without the administration of intravenous contrast. Automated exposure control, iterative reconstruction, and/or weight based adjustment of the mA/kV was utilized to reduce the radiation dose to as low as reasonably achievable.; CT of the cervical spine was performed without the administration of intravenous contrast. Multiplanar reformatted images are provided for review. Automated exposure control, iterative reconstruction, and/or weight based adjustment of the mA/kV was utilized to reduce the radiation dose to as low as reasonably achievable. COMPARISON: None. HISTORY: ORDERING SYSTEM PROVIDED HISTORY: fall, confusion TECHNOLOGIST PROVIDED HISTORY: Reason for exam:->fall, confusion Has a \"code stroke\" or \"stroke alert\" been called? ->No Decision Support Exception - unselect if not a suspected or confirmed emergency medical condition->Emergency Medical Condition (MA) What reading provider will be dictating this exam?->CRC FINDINGS: CT OF THE BRAIN: BRAIN/VENTRICLES: No mass effect, edema or hemorrhage is seen. Mild volume loss is seen in the cerebrum with mild chronic microvascular ischemic changes. No hydrocephalus or extra-axial fluid is seen. ORBITS: Prosthetic lenses are seen in the globes bilaterally. The orbits are otherwise grossly unremarkable. The the SINUSES: Mild mucosal thickening is seen in the paranasal sinuses. Small effusion in the right mastoid air cells. The left mastoid air cells are clear. SOFT TISSUES/SKULL: No acute abnormality of the visualized skull or soft tissues. CT CERVICAL SPINE: BONES/ALIGNMENT: No fracture or joint dislocation is seen in the cervical spine. Mild age indeterminate compression fracture deformity along the superior endplate of the T1 vertebral body. DEGENERATIVE CHANGES: Moderate loss of disc heights at C5-6 and C6-7.   Mild central canal scratch the small disc bulge results in mild central canal stenosis at C3-4. Multilevel facet and uncovertebral joint hypertrophic changes resulting in variable degrees of neural foraminal stenoses, worst (moderate to severe) at the left C3-4 level. SOFT TISSUES: There is no prevertebral soft tissue swelling. CT HEAD WITHOUT CONTRAST: 1. No skull fracture or acute intracranial abnormality. CT CERVICAL SPINE WITHOUT CONTRAST: 1. No fracture or joint dislocation is seen in the cervical spine. 2. Mild age indeterminate compression fracture deformity in the T1 vertebral body. If indicated, MRI may be obtained for further evaluation. 3. Degenerative changes, as described. CT THORACIC SPINE WO CONTRAST    Result Date: 3/10/2023  EXAMINATION: CT OF THE THORACIC SPINE WITHOUT CONTRAST  3/10/2023 4:54 pm: TECHNIQUE: CT of the thoracic spine was performed without the administration of intravenous contrast. Multiplanar reformatted images are provided for review. Automated exposure control, iterative reconstruction, and/or weight based adjustment of the mA/kV was utilized to reduce the radiation dose to as low as reasonably achievable. COMPARISON: None. HISTORY: ORDERING SYSTEM PROVIDED HISTORY: fall confusion TECHNOLOGIST PROVIDED HISTORY: Reason for exam:->fall confusion What reading provider will be dictating this exam?->CRC FINDINGS: BONES/ALIGNMENT: There is normal alignment of the spine. The vertebral body heights are maintained. No osseous destructive lesion is seen. DEGENERATIVE CHANGES: Multilevel degenerative changes. SOFT TISSUES: No paraspinal mass is seen. MISCELLANEOUS: Bilateral pulmonary opacities. No evidence of acute thoracic spine trauma. CT LUMBAR SPINE WO CONTRAST    Result Date: 3/10/2023  EXAMINATION: CT OF THE LUMBAR SPINE WITHOUT CONTRAST  3/10/2023 TECHNIQUE: CT of the lumbar spine was performed without the administration of intravenous contrast. Multiplanar reformatted images are provided for review.  Adjustment of mA and/or kV according to patient size was utilized. Automated exposure control, iterative reconstruction, and/or weight based adjustment of the mA/kV was utilized to reduce the radiation dose to as low as reasonably achievable. COMPARISON: None HISTORY: ORDERING SYSTEM PROVIDED HISTORY: fall confusion TECHNOLOGIST PROVIDED HISTORY: Reason for exam:->fall confusion Decision Support Exception - unselect if not a suspected or confirmed emergency medical condition->Emergency Medical Condition (MA) What reading provider will be dictating this exam?->CRC FINDINGS: BONES/ALIGNMENT: There is normal alignment of the spine. The vertebral body heights are maintained. No osseous destructive lesion is seen. DEGENERATIVE CHANGES: Multilevel degenerative changes there is is SOFT TISSUES/RETROPERITONEUM: No paraspinal mass is seen. No evidence of acute lumbar spine trauma. Multilevel degenerative changes. XR CHEST PORTABLE    Result Date: 3/10/2023  EXAMINATION: ONE XRAY VIEW OF THE CHEST 3/10/2023 7:13 pm COMPARISON: None. HISTORY: ORDERING SYSTEM PROVIDED HISTORY: central line TECHNOLOGIST PROVIDED HISTORY: Reason for exam:->central line What reading provider will be dictating this exam?->CRC FINDINGS: The lungs are without acute focal process. There is no effusion or pneumothorax. The cardiomediastinal silhouette is without acute process. The osseous structures are without acute process. Right internal jugular line tip in the proximal SVC. Right internal jugular line tip in the proximal SVC. No pneumothorax. XR HIP 2-3 VW W PELVIS RIGHT    Result Date: 3/10/2023  EXAMINATION: ONE XRAY VIEW OF THE PELVIS AND TWO XRAY VIEWS RIGHT HIP 3/10/2023 5:08 pm COMPARISON: None. HISTORY: ORDERING SYSTEM PROVIDED HISTORY: fall TECHNOLOGIST PROVIDED HISTORY: Reason for exam:->fall What reading provider will be dictating this exam?->CRC FINDINGS: The bones are demineralized. No evidence of pelvic fracture. Bilateral hips demonstrate normal alignment.  No focal osseous lesion. SI joints are symmetric. Arce catheter in place. No acute abnormality of the pelvis and right hip. Assessment/  68-year-old man with hypertension, history of DVT, mild chronic kidney disease with a baseline creatinine 1.1-1.2 maintained on metoprolol Dyazide and Eliquis admitted with hypotension, hypothermia rhabdomyolysis after being found down on the floor possibly for several days. White blood cell count is normal.  Has severe dehydration with a water deficit of about 4 L. Has a significant volume deficit as well. Has hypokalemia. Is making urine and expect renal recovery over the next few days. Imaging does not show any obstruction but does note right renal atrophy    Plan/  1-changed to D5 quarter normal saline with 20 of KCl at 150 cc an hour  2-serial labs  3-adjust fluids as needed  4-discussed with son    Thank you for the consultation. Please do not hesitate to call with questions.     Sumi Meng MD

## 2023-03-12 NOTE — PROGRESS NOTES
[facility] Occupational Therapy      Date: 3/12/2023  Patient Name: Bang Mcclelland        MRN: [id number]  Account: [de-identified]   : 1940  (78 y.o.)  Room: [place] [address]    Chart reviewed, attempted OT at 1330 for eval. Patient not seen 2° to:    Hold per nursing request due to: pt not yet ready to initiate therapy    Spoke to RN Darian Kim RN aware. Will attempt again when able.     Electronically signed by LAVERNE Ball on 3/12/2023 at 2:38 PM

## 2023-03-12 NOTE — PROGRESS NOTES
Physical Therapy Missed Treatment   Facility/Department: [facility] MED SURG IC08/IC08-01    NAME: Marina Barnard    : 1940 (78 y.o.)  MRN: [id number]    Account: [de-identified]  Gender: male      PT referral received. Chart reviewed. RN hold due to pt agitation. Will follow and attempt PT evaluation again at earliest availability.        No Hunter, PT, 23 at 2:28 PM

## 2023-03-12 NOTE — PLAN OF CARE
PODIATRIC MEDICINE AND SURGERY  CONSULT PROGRESS NOTE    INTERVAL EVENTS:  Patient resting comfortably at bedside  Off bear hugger, off levophed with flat affect. ASSESSMENT:  78 y.o. male with PMH significant for CKD, history of DVT, hypertension, basal cell carcinoma for who podiatry was consulted for painful toe nails and its nail debridement and foot care    PLAN AND RECOMMENDATIONS[de-identified]  - applied band aid to his right hallux nail.   - continue with am lactin twice daily.   - Recommend bilateral Prevalon boots while resting in the bed. - Patient will need follow up with podiatry in 2-3 months for routine nail care. - Podiatry will sign of at this time. Patient's case to be discussed with staff, Dr. Jess Terrazas, who will provide final recommendations going forward      No current facility-administered medications on file prior to encounter.      Current Outpatient Medications on File Prior to Encounter   Medication Sig Dispense Refill    triamterene-hydroCHLOROthiazide (DYAZIDE) 37.5-25 MG per capsule TAKE 1 CAPSULE BY MOUTH EVERY DAY      pravastatin (PRAVACHOL) 20 MG tablet TAKE 1 TABLET BY MOUTH EVERYDAY AT BEDTIME (Patient not taking: Reported on 3/11/2023)      metoprolol succinate (TOPROL XL) 25 MG extended release tablet Take by mouth daily      levothyroxine (SYNTHROID) 125 MCG tablet TAKE 1 TABLET BY MOUTH EVERY DAY      cyanocobalamin 1000 MCG/ML injection Inject into the muscle (Patient not taking: Reported on 3/11/2023)      aspirin 81 MG EC tablet Take by mouth daily (Patient not taking: Reported on 3/11/2023)      pravastatin (PRAVACHOL) 20 MG tablet TAKE 1 TABLET BY MOUTH EVERYDAY AT BEDTIME      ELIQUIS 5 MG TABS tablet TAKE 1 TABLET BY MOUTH TWICE A DAY           OBJECTIVE:  /74   Pulse 70   Temp 97.7 °F (36.5 °C) (Bladder)   Resp 22   Ht 6' (1.829 m)   Wt 195 lb 8.8 oz (88.7 kg)   SpO2 96%   BMI 26.52 kg/m²     The lateral border on the right nail is stable with mild dried blood, no active bleeding noted today. Skin appeared supple and hydrated today. LABS:   Lab Results   Component Value Date    WBC 8.9 03/12/2023    HGB 10.1 (L) 03/12/2023    HCT 30.0 (L) 03/12/2023    MCV 93.7 (H) 03/12/2023    PLT 82 (L) 03/12/2023     Lab Results   Component Value Date/Time     03/12/2023 06:00 AM    K 3.2 03/12/2023 06:00 AM     03/12/2023 06:00 AM    CO2 21 03/12/2023 06:00 AM    BUN 92 03/12/2023 06:00 AM    CREATININE 3.28 03/12/2023 06:00 AM    GLUCOSE 218 03/12/2023 06:00 AM    CALCIUM 7.8 03/12/2023 06:00 AM      Lab Results   Component Value Date    LABALBU 2.1 (L) 03/12/2023     No results found for: SEDRATE  No results found for: CRP  No results found for: LABA1C    MICROBIOLOGY:   None obtained.      NEW IMAGING:   None obtained         Florentino Lee DPM,  PGY-2  Podiatric Surgery Resident  Podiatry On Call Pager: [phone]  March 12, 2023  12:02 PM

## 2023-03-12 NOTE — FLOWSHEET NOTE
Summary:  Assessment completed and documented. Wounds as documented, with dressings clean, dry, and intact. Oral mucosa is dry and tongue is coated with thick brown secretions. Is less agitated throughout the shift. Needed frequent oral care. Tolerating tube feedings well.

## 2023-03-12 NOTE — PROGRESS NOTES
Critical Care Progress Note    3/12/2023 10:51 AM    Subjective:   Admit Date: 3/10/2023  PCP: No primary care provider on file. Chief Complaint   Patient presents with    Fall     Interval History: Blood pressure has improved. Currently off of Levophed. Urine output has improved. Continues to be agitated and confused. Tried Precedex with no major change in mental status but dropped his heart rate. Sodium is worse at 155. Potassium is 3.2. Medications:   Scheduled Meds:   sodium chloride flush  5-40 mL IntraVENous 2 times per day    pantoprazole (PROTONIX) 40 mg injection  40 mg IntraVENous Q12H    piperacillin-tazobactam  3,375 mg IntraVENous Q12H    ammonium lactate   Topical Daily    enoxaparin  1 mg/kg SubCUTAneous Daily    vancomycin (VANCOCIN) intermittent dosing (placeholder)   Other RX Placeholder     Continuous Infusions:   sodium chloride      dextrose 5 % and 0.45 % NaCl 100 mL/hr at 23 0526    dexmedetomidine (PRECEDEX) IV infusion Stopped (23 0859)    norepinephrine Stopped (23 0900)         Objective:   Vitals:   Temp (24hrs), Av.7 °F (37.1 °C), Min:97.7 °F (36.5 °C), Max:99.5 °F (37.5 °C)    /74   Pulse 70   Temp 97.7 °F (36.5 °C) (Bladder)   Resp 22   Ht 6' (1.829 m)   Wt 195 lb 8.8 oz (88.7 kg)   SpO2 96%   BMI 26.52 kg/m²   I/O:24HR INTAKE/OUTPUT:    Intake/Output Summary (Last 24 hours) at 3/12/2023 1051  Last data filed at 3/12/2023 3301  Gross per 24 hour   Intake 2522. 85 ml   Output 1075 ml   Net 1447.85 ml      0701 -  0700  In: 2522.9 [I.V.:2296.4]  Out: 9631 [Urine:1075]  CVP:             Physical Exam:    General appearance -ill appearing  Mental status -encephalopathic. Move extremities spontaneously and trying to pull lines.   Eyes - pupils equal and reactive   Nose - normal and patent   Neck - supple, no significant adenopathy  Chest -diminished bilaterally to auscultation, no wheezes, rales or rhonchi, symmetric air entry  Heart -regular rate, regular rhythm, normal S1, S2, no murmurs   Abdomen - soft, nontender, nondistended, bowel sounds are present  Rectal - deferred, not clinically indicated  Neurological -encephalopathic, difficult to redirect. Move extremities spontaneously. Musculoskeletal - no joint tenderness, deformity or swelling  Extremities - peripheral pulses normal, no pedal edema, no clubbing or cyanosis  Skin -multiple pressure ulcers on coccyx and back          BMP:    Recent Labs     03/10/23  1430 03/10/23  1508 03/11/23  0345 03/12/23  0600   *  --  150* 155*   K 3.6  --  3.0* 3.2*     --  114* 121*   CO2 19*  --  19* 21   BUN 92*  --  92* 92*   CREATININE 2.63*   < > 2.90* 3.28*   GLUCOSE 149*   < > 112* 218*    < > = values in this interval not displayed. .  MG:3,PHOS:3)@  Ionized Calcium: No results found for: IONCA  CBC:   Recent Labs     03/11/23  0345 03/12/23  0600   WBC 8.7 8.9   HGB 11.6* 10.1*   PLT 99* 82*      ABG: No results for input(s): PH, PCO2, PO2 in the last 72 hours. Assessment and Plan:     Impression:     - Septic shock due to bacteremia. This has improved. -Gram-negative pamela bacteremia. -Urinary tract infection  -Toxic metabolic encephalopathy. Slightly better  -Acute renal failure due to shock. Creatinine is worse today. Started making some urine.  -Hypernatremia. Worse today. -Severe hypokalemia. Improved slightly  -Metabolic  acidosis. -Elevated LFTs. Recommendations:     -Continue chronic care in the ICU for hemodynamic and airway monitoring.  -Observe off of vasopressors.  -Strict intake and output measurement. Watch kidney function closely.  -Started making urine. Will challenge with Lasix. -BMP every 12 hours. Consult nephrology. -Broad-spectrum antibiotics pending cultures. -Replace potassium.  -Status post control.  -Trend LFTs. -DVT and GI prophylaxis. Patient is DNR CCA with no intubation.       Prophylaxis:  Stress ulcer: [] PPI Agent [] H2RA [] Sucralfate [] Other:   VTE: [] Enoxaparin  [] SC Heparin  [] SCD      DNR-CCA      Excluding procedures, the total critical care time caring for this patient with life threatening, unstable organ failure, including direct patient contact, review of medical record, management of life support systems, review of data including imaging and labs, discussions with other team members, patient's family and physicians at least 31 minutes so far today.         Electronically signed by Daisy Corbin MD on 3/12/2023 at 10:51 AM

## 2023-03-13 PROBLEM — N17.9 AKI (ACUTE KIDNEY INJURY) (HCC): Status: ACTIVE | Noted: 2023-03-13

## 2023-03-13 PROBLEM — G93.40 ACUTE ENCEPHALOPATHY: Status: ACTIVE | Noted: 2023-03-13

## 2023-03-13 LAB
ALBUMIN SERPL-MCNC: 1.9 G/DL (ref 3.5–4.6)
ALP BLD-CCNC: 106 U/L (ref 35–104)
ALT SERPL-CCNC: 161 U/L (ref 0–41)
ANION GAP SERPL CALCULATED.3IONS-SCNC: 11 MEQ/L (ref 9–15)
AST SERPL-CCNC: 82 U/L (ref 0–40)
ATYPICAL LYMPHOCYTE RELATIVE PERCENT: 1 %
BANDED NEUTROPHILS RELATIVE PERCENT: 13 %
BASOPHILS ABSOLUTE: 0 K/UL (ref 0–0.2)
BASOPHILS RELATIVE PERCENT: 0.3 %
BILIRUB SERPL-MCNC: 1.1 MG/DL (ref 0.2–0.7)
BUN BLDV-MCNC: 89 MG/DL (ref 8–23)
CALCIUM SERPL-MCNC: 7.5 MG/DL (ref 8.5–9.9)
CHLORIDE BLD-SCNC: 119 MEQ/L (ref 95–107)
CO2: 21 MEQ/L (ref 20–31)
CREAT SERPL-MCNC: 3.18 MG/DL (ref 0.7–1.2)
EKG ATRIAL RATE: 38 BPM
EKG ATRIAL RATE: 66 BPM
EKG P AXIS: 81 DEGREES
EKG P-R INTERVAL: 190 MS
EKG Q-T INTERVAL: 440 MS
EKG Q-T INTERVAL: 534 MS
EKG QRS DURATION: 86 MS
EKG QRS DURATION: 96 MS
EKG QTC CALCULATION (BAZETT): 559 MS
EKG QTC CALCULATION (BAZETT): 570 MS
EKG R AXIS: 117 DEGREES
EKG R AXIS: 73 DEGREES
EKG T AXIS: 171 DEGREES
EKG T AXIS: 9 DEGREES
EKG VENTRICULAR RATE: 101 BPM
EKG VENTRICULAR RATE: 66 BPM
EOSINOPHILS ABSOLUTE: 0 K/UL (ref 0–0.7)
EOSINOPHILS RELATIVE PERCENT: 0.4 %
GFR SERPL CREATININE-BSD FRML MDRD: 18.4 ML/MIN/{1.73_M2}
GLOBULIN: 2.5 G/DL (ref 2.3–3.5)
GLUCOSE BLD-MCNC: 165 MG/DL (ref 70–99)
HCT VFR BLD CALC: 30.4 % (ref 42–52)
HEMOGLOBIN: 10.4 G/DL (ref 14–18)
LYMPHOCYTES ABSOLUTE: 0.5 K/UL (ref 1–4.8)
LYMPHOCYTES RELATIVE PERCENT: 5 %
MAGNESIUM: 2.2 MG/DL (ref 1.7–2.4)
MCH RBC QN AUTO: 31.9 PG (ref 27–31.3)
MCHC RBC AUTO-ENTMCNC: 34.1 % (ref 33–37)
MCV RBC AUTO: 93.5 FL (ref 79–92.2)
MONOCYTES ABSOLUTE: 0 K/UL (ref 0.2–0.8)
MONOCYTES RELATIVE PERCENT: 3.7 %
NEUTROPHILS ABSOLUTE: 8.6 K/UL (ref 1.4–6.5)
NEUTROPHILS RELATIVE PERCENT: 81 %
NUCLEATED RED BLOOD CELLS: 1 /100 WBC
PDW BLD-RTO: 14.6 % (ref 11.5–14.5)
PLATELET # BLD: 75 K/UL (ref 130–400)
PLATELET SLIDE REVIEW: ABNORMAL
POIKILOCYTES: ABNORMAL
POTASSIUM SERPL-SCNC: 3.7 MEQ/L (ref 3.4–4.9)
RBC # BLD: 3.25 M/UL (ref 4.7–6.1)
SODIUM BLD-SCNC: 151 MEQ/L (ref 135–144)
TOTAL CK: 394 U/L (ref 0–190)
TOTAL CK: 448 U/L (ref 0–190)
TOTAL PROTEIN: 4.4 G/DL (ref 6.3–8)
WBC # BLD: 9.1 K/UL (ref 4.8–10.8)

## 2023-03-13 PROCEDURE — 97167 OT EVAL HIGH COMPLEX 60 MIN: CPT

## 2023-03-13 PROCEDURE — C9113 INJ PANTOPRAZOLE SODIUM, VIA: HCPCS | Performed by: INTERNAL MEDICINE

## 2023-03-13 PROCEDURE — 6360000002 HC RX W HCPCS: Performed by: NURSE PRACTITIONER

## 2023-03-13 PROCEDURE — 2580000003 HC RX 258: Performed by: INTERNAL MEDICINE

## 2023-03-13 PROCEDURE — 2500000003 HC RX 250 WO HCPCS: Performed by: INTERNAL MEDICINE

## 2023-03-13 PROCEDURE — 83735 ASSAY OF MAGNESIUM: CPT

## 2023-03-13 PROCEDURE — 99233 SBSQ HOSP IP/OBS HIGH 50: CPT | Performed by: INTERNAL MEDICINE

## 2023-03-13 PROCEDURE — 6360000002 HC RX W HCPCS: Performed by: INTERNAL MEDICINE

## 2023-03-13 PROCEDURE — 82550 ASSAY OF CK (CPK): CPT

## 2023-03-13 PROCEDURE — 97163 PT EVAL HIGH COMPLEX 45 MIN: CPT

## 2023-03-13 PROCEDURE — 85025 COMPLETE CBC W/AUTO DIFF WBC: CPT

## 2023-03-13 PROCEDURE — 2580000003 HC RX 258: Performed by: NURSE PRACTITIONER

## 2023-03-13 PROCEDURE — 36415 COLL VENOUS BLD VENIPUNCTURE: CPT

## 2023-03-13 PROCEDURE — 99213 OFFICE O/P EST LOW 20 MIN: CPT

## 2023-03-13 PROCEDURE — 80053 COMPREHEN METABOLIC PANEL: CPT

## 2023-03-13 PROCEDURE — A4216 STERILE WATER/SALINE, 10 ML: HCPCS | Performed by: INTERNAL MEDICINE

## 2023-03-13 PROCEDURE — 1210000000 HC MED SURG R&B

## 2023-03-13 RX ADMIN — SODIUM CHLORIDE, PRESERVATIVE FREE 40 MG: 5 INJECTION INTRAVENOUS at 21:44

## 2023-03-13 RX ADMIN — POTASSIUM CHLORIDE 10 MEQ: 7.46 INJECTION, SOLUTION INTRAVENOUS at 00:15

## 2023-03-13 RX ADMIN — POTASSIUM CHLORIDE 10 MEQ: 7.46 INJECTION, SOLUTION INTRAVENOUS at 02:14

## 2023-03-13 RX ADMIN — SODIUM CHLORIDE, PRESERVATIVE FREE 40 MG: 5 INJECTION INTRAVENOUS at 10:08

## 2023-03-13 RX ADMIN — Medication: at 10:07

## 2023-03-13 RX ADMIN — POTASSIUM CHLORIDE, DEXTROSE MONOHYDRATE AND SODIUM CHLORIDE: 150; 5; 200 INJECTION, SOLUTION INTRAVENOUS at 05:07

## 2023-03-13 RX ADMIN — SODIUM CHLORIDE, PRESERVATIVE FREE 10 ML: 5 INJECTION INTRAVENOUS at 10:08

## 2023-03-13 RX ADMIN — PIPERACILLIN AND TAZOBACTAM 3375 MG: 3; .375 INJECTION, POWDER, FOR SOLUTION INTRAVENOUS at 21:50

## 2023-03-13 RX ADMIN — ENOXAPARIN SODIUM 90 MG: 100 INJECTION SUBCUTANEOUS at 10:07

## 2023-03-13 RX ADMIN — POTASSIUM CHLORIDE 10 MEQ: 7.46 INJECTION, SOLUTION INTRAVENOUS at 03:16

## 2023-03-13 RX ADMIN — SODIUM CHLORIDE, PRESERVATIVE FREE 10 ML: 5 INJECTION INTRAVENOUS at 21:44

## 2023-03-13 RX ADMIN — POTASSIUM CHLORIDE 10 MEQ: 7.46 INJECTION, SOLUTION INTRAVENOUS at 01:04

## 2023-03-13 RX ADMIN — POTASSIUM CHLORIDE, DEXTROSE MONOHYDRATE AND SODIUM CHLORIDE: 150; 5; 200 INJECTION, SOLUTION INTRAVENOUS at 19:00

## 2023-03-13 RX ADMIN — PIPERACILLIN AND TAZOBACTAM 3375 MG: 3; .375 INJECTION, POWDER, FOR SOLUTION INTRAVENOUS at 10:06

## 2023-03-13 ASSESSMENT — PAIN SCALES - GENERAL: PAINLEVEL_OUTOF10: 0

## 2023-03-13 ASSESSMENT — PAIN SCALES - WONG BAKER: WONGBAKER_NUMERICALRESPONSE: 0

## 2023-03-13 NOTE — PROGRESS NOTES
Renal Progress Note    Assessment and Plan:    78-year-old man with hypertension, history of DVT, mild chronic kidney disease with a baseline creatinine 1.1-1.2 maintained on metoprolol Dyazide and Eliquis admitted with hypotension, hypothermia rhabdomyolysis after being found down on the floor possibly for several days. White blood cell count is normal.  Has severe dehydration with a water deficit of about 4 L. Has a significant volume deficit as well. Has hypokalemia. Is making urine and expect renal recovery over the next few days. Imaging does not show any obstruction but does note right renal atrophy     Plan/  1-changed to D5 quarter normal saline with 20 of KCl at 150 cc an hour  2-serial labs  3-adjust fluids as needed        Patient Active Problem List:     Shock [facility])      Subjective:   Admit Date: 3/10/2023    Interval History: good uo. Na better. Cr stable but still high. On abx. Medications:   Scheduled Meds:   sodium chloride flush  5-40 mL IntraVENous 2 times per day    pantoprazole (PROTONIX) 40 mg injection  40 mg IntraVENous Q12H    piperacillin-tazobactam  3,375 mg IntraVENous Q12H    ammonium lactate   Topical Daily    enoxaparin  1 mg/kg SubCUTAneous Daily     Continuous Infusions:   dextrose 5% and 0.2% NaCl with KCl 20 mEq 150 mL/hr at 03/13/23 0507    sodium chloride         CBC:   Recent Labs     03/12/23  0600 03/13/23  0406   WBC 8.9 9.1   HGB 10.1* 10.4*   PLT 82* 75*     CMP:    Recent Labs     03/12/23  0600 03/12/23  1751 03/13/23  0406   * 150* 151*   K 3.2* 2.8* 3.7   * 119* 119*   CO2 21 21 21   BUN 92* 93* 89*   CREATININE 3.28* 3.11* 3.18*   GLUCOSE 218* 180* 165*   CALCIUM 7.8* 7.6* 7.5*   LABGLOM 17.7* 18.9* 18.4*     Troponin:   Recent Labs     03/11/23  0918   TROPONINI 0.060*     BNP: No results for input(s): BNP in the last 72 hours.   INR:   Recent Labs     03/10/23  1430   INR 1.2     Lipids: No results for input(s): CHOL, LDLDIRECT, TRIG, HDL, AMYLASE, LIPASE in the last 72 hours. Liver:   Recent Labs     03/13/23  0406   AST 82*   *   ALKPHOS 106*   PROT 4.4*   LABALBU 1.9*   BILITOT 1.1*     Iron:  No results for input(s): IRONS, FERRITIN in the last 72 hours. Invalid input(s): LABIRONS  Urinalysis: No results for input(s): UA in the last 72 hours. Objective:   Vitals: BP 95/66   Pulse 67   Temp 98.2 °F (36.8 °C) (Oral)   Resp 18   Ht 6' (1.829 m)   Wt 202 lb 3.2 oz (91.7 kg)   SpO2 98%   BMI 27.42 kg/m²    Wt Readings from Last 3 Encounters:   03/13/23 202 lb 3.2 oz (91.7 kg)      24HR INTAKE/OUTPUT:    Intake/Output Summary (Last 24 hours) at 3/13/2023 1025  Last data filed at 3/13/2023 0645  Gross per 24 hour   Intake --   Output 4125 ml   Net -4125 ml       Constitutional:  Alert, awake, no apparent distress   Skin:normal, no rash  HEENT:sclera anicteric.   Head atraumatic normocephalic  Neck:supple with no thyromegally  Cardiovascular:  S1, S2 without m/r/g   Respiratory:  CTA B without w/r/r   Abdomen: +bs, soft, nt  Ext: no LE edema  Musculoskeletal:Intact  Neuro:Alert and oriented with no deficit      Electronically signed by Radha Wesley MD on 3/13/2023 at 10:25 AM

## 2023-03-13 NOTE — PROGRESS NOTES
INPATIENT PROGRESS NOTES    PATIENT NAME: Marina Barnard  MRN: [id number]  SERVICE DATE:  March 13, 2023   SERVICE TIME:  1:23 PM      PRIMARY SERVICE: Pulmonary Disease    CHIEF COMPLAIN: Septic shock      INTERVAL HPI: Patient seen and examined at bedside, Interval Notes, orders reviewed. Nursing notes noted  Patient is opening eyes but unable to talk much. Patient is still very sleepy. He is afebrile. He is on 3 L O2 via nasal cannula O2 saturation 98%. He had fall at home. His transfer out of ICU. He is comfortable in bed. He has metabolic encephalopathy. He is on broad-spectrum antibiotic with IV Zosyn. OBJECTIVE    Body mass index is 27.42 kg/m². PHYSICAL EXAM:  Vitals:  BP 95/66   Pulse 67   Temp 98.2 °F (36.8 °C) (Oral)   Resp 18   Ht 6' (1.829 m)   Wt 202 lb 3.2 oz (91.7 kg)   SpO2 98%   BMI 27.42 kg/m²   General: Sleepy, trying to open eyes and talk. Comfortable in bed, No distress. Head: Atraumatic , Normocephalic   Eyes: PERRL. No sclera icterus. No conjunctival injection. No discharge   ENT: No nasal  discharge. Pharynx clear. Neck:  Trachea midline. No thyromegaly, no JVD, No cervical adenopathy. Chest : Bilaterally symmetrical ,Normal effort,  No accessory muscle use  Lung : Diminished BS bilateral, decreased BS at bases. No Rales. No wheezing. No rhonchi. Heart[de-identified] Normal  rate. Regular rhythm. No mumur ,  Rub or gallop  ABD: Non-tender. Non-distended. No masses. No organmegaly. Normal bowel sounds. No hernia.   Ext : No Pitting both leg , No Cyanosis No clubbing  Neuro: no focal weakness          DATA:   Recent Labs     03/12/23  0600 03/13/23  0406   WBC 8.9 9.1   HGB 10.1* 10.4*   HCT 30.0* 30.4*   MCV 93.7* 93.5*   PLT 82* 75*     Recent Labs     03/12/23  0600 03/12/23  1751 03/13/23  0406   * 150* 151*   K 3.2* 2.8* 3.7   * 119* 119*   CO2 21 21 21   BUN 92* 93* 89*   CREATININE 3.28* 3.11* 3.18*   GLUCOSE 218* 180* 165*   CALCIUM 7.8* 7.6* 7.5*   PROT 4.6*  -- 4.4*   LABALBU 2.1*  --  1.9*   BILITOT 1.3*  --  1.1*   ALKPHOS 82  --  106*   AST 90*  --  82*   *  --  161*   LABGLOM 17.7* 18.9* 18.4*   GLOB 2.5  --  2.5       MV Settings:          Recent Labs     03/10/23  2002   PHART 7.361   TPE3IBA 34*   PO2ART 62*   JYC1ROG 19.0*   BEART -7*   R0XHBTJO 91*       O2 Device: Nasal cannula  O2 Flow Rate (L/min): 3 L/min    No diet orders on file     MEDICATIONS during current hospitalization:    Continuous Infusions:   dextrose 5% and 0.2% NaCl with KCl 20 mEq 150 mL/hr at 03/13/23 0507    sodium chloride         Scheduled Meds:   sodium chloride flush  5-40 mL IntraVENous 2 times per day    pantoprazole (PROTONIX) 40 mg injection  40 mg IntraVENous Q12H    piperacillin-tazobactam  3,375 mg IntraVENous Q12H    ammonium lactate   Topical Daily    enoxaparin  1 mg/kg SubCUTAneous Daily       PRN Meds:sodium chloride flush, sodium chloride, [Held by provider] ondansetron **OR** [Held by provider] ondansetron, polyethylene glycol, acetaminophen **OR** acetaminophen, trimethobenzamide    Radiology  CT ABDOMEN PELVIS WO CONTRAST Additional Contrast? None    Result Date: 3/10/2023  EXAMINATION: CT OF THE ABDOMEN AND PELVIS WITHOUT CONTRAST 3/10/2023 4:54 pm TECHNIQUE: CT of the abdomen and pelvis was performed without the administration of intravenous contrast. Multiplanar reformatted images are provided for review. Automated exposure control, iterative reconstruction, and/or weight based adjustment of the mA/kV was utilized to reduce the radiation dose to as low as reasonably achievable. COMPARISON: None.  HISTORY: ORDERING SYSTEM PROVIDED HISTORY: fall TECHNOLOGIST PROVIDED HISTORY: Reason for exam:->fall Additional Contrast?->None Decision Support Exception - unselect if not a suspected or confirmed emergency medical condition->Emergency Medical Condition ([place]) What reading provider will be dictating this exam?->CRC FINDINGS: Lower Chest: Left lower lobe opacity consistent with atelectasis although pneumonia not totally excludable. Organs: The liver, spleen, adrenal glands, pancreas are normal.  Gallbladder is mildly distended with gallbladder sludge. Right renal atrophy. GI/Bowel: Diffuse colonic fecal retention. Normal small bowel and appendix. Pelvis: Prostatomegaly. Arce catheter in a decompressed urinary bladder. Peritoneum/Retroperitoneum: No free fluid or free air. Bones/Soft Tissues:   Right inguinal hernia containing normal loops of bowel. Degenerative changes lumbar spine. No fractures. Atraumatic appearance of the abdomen and pelvis. Prostatomegaly. Right renal atrophy. XR FEMUR RIGHT (MIN 2 VIEWS)    Result Date: 3/10/2023  EXAMINATION: XRAY VIEWS OF THE RIGHT FEMUR 3/10/2023 5:08 pm COMPARISON: None. HISTORY: ORDERING SYSTEM PROVIDED HISTORY: fall TECHNOLOGIST PROVIDED HISTORY: Reason for exam:->fall What reading provider will be dictating this exam?->CRC FINDINGS: There is no evidence of acute fracture. There is normal alignment. No acute joint abnormality. No focal osseous lesion. No focal soft tissue abnormality. No acute osseous abnormality. CT Head W/O Contrast    Result Date: 3/10/2023  EXAMINATION: CT OF THE HEAD WITHOUT CONTRAST; CT OF THE CERVICAL SPINE WITHOUT CONTRAST 3/10/2023 4:54 pm TECHNIQUE: CT of the head was performed without the administration of intravenous contrast. Automated exposure control, iterative reconstruction, and/or weight based adjustment of the mA/kV was utilized to reduce the radiation dose to as low as reasonably achievable.; CT of the cervical spine was performed without the administration of intravenous contrast. Multiplanar reformatted images are provided for review. Automated exposure control, iterative reconstruction, and/or weight based adjustment of the mA/kV was utilized to reduce the radiation dose to as low as reasonably achievable. COMPARISON: None.  HISTORY: ORDERING SYSTEM PROVIDED HISTORY: fall, confusion TECHNOLOGIST PROVIDED HISTORY: Reason for exam:->fall, confusion Has a \"code stroke\" or \"stroke alert\" been called? ->No Decision Support Exception - unselect if not a suspected or confirmed emergency medical condition->Emergency Medical Condition ([place]) What reading provider will be dictating this exam?->CRC FINDINGS: CT OF THE BRAIN: BRAIN/VENTRICLES: No mass effect, edema or hemorrhage is seen. Mild volume loss is seen in the cerebrum with mild chronic microvascular ischemic changes. No hydrocephalus or extra-axial fluid is seen. ORBITS: Prosthetic lenses are seen in the globes bilaterally. The orbits are otherwise grossly unremarkable. The the SINUSES: Mild mucosal thickening is seen in the paranasal sinuses. Small effusion in the right mastoid air cells. The left mastoid air cells are clear. SOFT TISSUES/SKULL: No acute abnormality of the visualized skull or soft tissues. CT CERVICAL SPINE: BONES/ALIGNMENT: No fracture or joint dislocation is seen in the cervical spine. Mild age indeterminate compression fracture deformity along the superior endplate of the T1 vertebral body. DEGENERATIVE CHANGES: Moderate loss of disc heights at C5-6 and C6-7. Mild central canal scratch the small disc bulge results in mild central canal stenosis at C3-4. Multilevel facet and uncovertebral joint hypertrophic changes resulting in variable degrees of neural foraminal stenoses, worst (moderate to severe) at the left C3-4 level. SOFT TISSUES: There is no prevertebral soft tissue swelling. CT HEAD WITHOUT CONTRAST: 1. No skull fracture or acute intracranial abnormality. CT CERVICAL SPINE WITHOUT CONTRAST: 1. No fracture or joint dislocation is seen in the cervical spine. 2. Mild age indeterminate compression fracture deformity in the T1 vertebral body. If indicated, MRI may be obtained for further evaluation. 3. Degenerative changes, as described.      CT CHEST WO CONTRAST    Result Date: 3/10/2023  EXAMINATION: CT OF THE CHEST WITHOUT CONTRAST 3/10/2023 4:54 pm TECHNIQUE: CT of the chest was performed without the administration of intravenous contrast. Multiplanar reformatted images are provided for review. Automated exposure control, iterative reconstruction, and/or weight based adjustment of the mA/kV was utilized to reduce the radiation dose to as low as reasonably achievable. COMPARISON: None. HISTORY: ORDERING SYSTEM PROVIDED HISTORY: fall TECHNOLOGIST PROVIDED HISTORY: Reason for exam:->fall Decision Support Exception - unselect if not a suspected or confirmed emergency medical condition->Emergency Medical Condition ([place]) What reading provider will be dictating this exam?->CRC FINDINGS: Mediastinum: Thoracic aorta is unremarkable. Heart and pericardium are normal.  Calcified plaque involving the coronary arteries. No significant mediastinal adenopathy. Lungs/pleura: Patchy right upper lobe lung opacities consistent with pneumonia. Bibasilar atelectasis. Upper Abdomen:   Gallbladder sludge. Soft Tissues/Bones: Fractures. Atraumatic appearance of the chest. Patchy right upper lobe opacities consistent with pneumonia. CT CERVICAL SPINE WO CONTRAST    Result Date: 3/10/2023  EXAMINATION: CT OF THE HEAD WITHOUT CONTRAST; CT OF THE CERVICAL SPINE WITHOUT CONTRAST 3/10/2023 4:54 pm TECHNIQUE: CT of the head was performed without the administration of intravenous contrast. Automated exposure control, iterative reconstruction, and/or weight based adjustment of the mA/kV was utilized to reduce the radiation dose to as low as reasonably achievable.; CT of the cervical spine was performed without the administration of intravenous contrast. Multiplanar reformatted images are provided for review. Automated exposure control, iterative reconstruction, and/or weight based adjustment of the mA/kV was utilized to reduce the radiation dose to as low as reasonably achievable. COMPARISON: None.  HISTORY: ORDERING SYSTEM PROVIDED HISTORY: fall, confusion TECHNOLOGIST PROVIDED HISTORY: Reason for exam:->fall, confusion Has a \"code stroke\" or \"stroke alert\" been called? ->No Decision Support Exception - unselect if not a suspected or confirmed emergency medical condition->Emergency Medical Condition ([place]) What reading provider will be dictating this exam?->CRC FINDINGS: CT OF THE BRAIN: BRAIN/VENTRICLES: No mass effect, edema or hemorrhage is seen. Mild volume loss is seen in the cerebrum with mild chronic microvascular ischemic changes. No hydrocephalus or extra-axial fluid is seen. ORBITS: Prosthetic lenses are seen in the globes bilaterally. The orbits are otherwise grossly unremarkable. The the SINUSES: Mild mucosal thickening is seen in the paranasal sinuses. Small effusion in the right mastoid air cells. The left mastoid air cells are clear. SOFT TISSUES/SKULL: No acute abnormality of the visualized skull or soft tissues. CT CERVICAL SPINE: BONES/ALIGNMENT: No fracture or joint dislocation is seen in the cervical spine. Mild age indeterminate compression fracture deformity along the superior endplate of the T1 vertebral body. DEGENERATIVE CHANGES: Moderate loss of disc heights at C5-6 and C6-7. Mild central canal scratch the small disc bulge results in mild central canal stenosis at C3-4. Multilevel facet and uncovertebral joint hypertrophic changes resulting in variable degrees of neural foraminal stenoses, worst (moderate to severe) at the left C3-4 level. SOFT TISSUES: There is no prevertebral soft tissue swelling. CT HEAD WITHOUT CONTRAST: 1. No skull fracture or acute intracranial abnormality. CT CERVICAL SPINE WITHOUT CONTRAST: 1. No fracture or joint dislocation is seen in the cervical spine. 2. Mild age indeterminate compression fracture deformity in the T1 vertebral body. If indicated, MRI may be obtained for further evaluation. 3. Degenerative changes, as described.      CT THORACIC SPINE WO CONTRAST    Result Date: 3/10/2023  EXAMINATION: CT OF THE THORACIC SPINE WITHOUT CONTRAST  3/10/2023 4:54 pm: TECHNIQUE: CT of the thoracic spine was performed without the administration of intravenous contrast. Multiplanar reformatted images are provided for review. Automated exposure control, iterative reconstruction, and/or weight based adjustment of the mA/kV was utilized to reduce the radiation dose to as low as reasonably achievable. COMPARISON: None. HISTORY: ORDERING SYSTEM PROVIDED HISTORY: fall confusion TECHNOLOGIST PROVIDED HISTORY: Reason for exam:->fall confusion What reading provider will be dictating this exam?->CRC FINDINGS: BONES/ALIGNMENT: There is normal alignment of the spine. The vertebral body heights are maintained. No osseous destructive lesion is seen. DEGENERATIVE CHANGES: Multilevel degenerative changes. SOFT TISSUES: No paraspinal mass is seen. MISCELLANEOUS: Bilateral pulmonary opacities. No evidence of acute thoracic spine trauma. CT LUMBAR SPINE WO CONTRAST    Result Date: 3/10/2023  EXAMINATION: CT OF THE LUMBAR SPINE WITHOUT CONTRAST  3/10/2023 TECHNIQUE: CT of the lumbar spine was performed without the administration of intravenous contrast. Multiplanar reformatted images are provided for review. Adjustment of mA and/or kV according to patient size was utilized. Automated exposure control, iterative reconstruction, and/or weight based adjustment of the mA/kV was utilized to reduce the radiation dose to as low as reasonably achievable. COMPARISON: None HISTORY: ORDERING SYSTEM PROVIDED HISTORY: fall confusion TECHNOLOGIST PROVIDED HISTORY: Reason for exam:->fall confusion Decision Support Exception - unselect if not a suspected or confirmed emergency medical condition->Emergency Medical Condition ([place]) What reading provider will be dictating this exam?->CRC FINDINGS: BONES/ALIGNMENT: There is normal alignment of the spine. The vertebral body heights are maintained.  No osseous destructive lesion is seen. DEGENERATIVE CHANGES: Multilevel degenerative changes there is is SOFT TISSUES/RETROPERITONEUM: No paraspinal mass is seen. No evidence of acute lumbar spine trauma. Multilevel degenerative changes. XR CHEST PORTABLE    Result Date: 3/10/2023  EXAMINATION: ONE XRAY VIEW OF THE CHEST 3/10/2023 7:13 pm COMPARISON: None. HISTORY: ORDERING SYSTEM PROVIDED HISTORY: central line TECHNOLOGIST PROVIDED HISTORY: Reason for exam:->central line What reading provider will be dictating this exam?->CRC FINDINGS: The lungs are without acute focal process. There is no effusion or pneumothorax. The cardiomediastinal silhouette is without acute process. The osseous structures are without acute process. Right internal jugular line tip in the proximal SVC. Right internal jugular line tip in the proximal SVC. No pneumothorax. XR HIP 2-3 VW W PELVIS RIGHT    Result Date: 3/10/2023  EXAMINATION: ONE XRAY VIEW OF THE PELVIS AND TWO XRAY VIEWS RIGHT HIP 3/10/2023 5:08 pm COMPARISON: None. HISTORY: ORDERING SYSTEM PROVIDED HISTORY: fall TECHNOLOGIST PROVIDED HISTORY: Reason for exam:->fall What reading provider will be dictating this exam?->CRC FINDINGS: The bones are demineralized. No evidence of pelvic fracture. Bilateral hips demonstrate normal alignment. No focal osseous lesion. SI joints are symmetric. Arce catheter in place. No acute abnormality of the pelvis and right hip. IMPRESSION AND SUGGESTION:  Septic shock due to bacteremia. Gram-negative pamela bacteremia. Urinary tract infection  Toxic metabolic encephalopathy. Slightly better  Acute renal failure due to shock. Hypernatremia. Worse today. Severe hypokalemia. Improved slightly  Metabolic  acidosis. Elevated LFTs. Continue O2 to keep saturation 90% or above. Renal is following. Strict input output. Watch kidney function closely. hypernatremia with sodium of 151.   Liver function is slightly improving. Continue broad-spectrum antibiotic. Lab and x-ray reviewed. Continue present treatment plan    NOTE: This report was transcribed using voice recognition software. Every effort was made to ensure accuracy; however, inadvertent computerized transcription errors may be present.       Electronically signed by Isabella Haji MD, FCCP on 3/13/2023 at 1:23 PM

## 2023-03-13 NOTE — DISCHARGE INSTR - COC
Continuity of Care Form    Patient Name: Tony Quijano   :  1940  MRN:  [id number]    Admit date:  3/10/2023  Discharge date:  ***    Code Status Order: DNR-CC   Advance Directives:     Admitting Physician:  Aleida Zaldivar DO  PCP: No primary care provider on file. Discharging Nurse: [facility] Unit/Room#: N608/G348-90  Discharging Unit Phone Number: ***    Emergency Contact:   Extended Emergency Contact Information  Primary Emergency Contact: Moises Boast  Mobile Phone: [phone]  Relation: Child    Past Surgical History:  History reviewed. No pertinent surgical history.     Immunization History:   Immunization History   Administered Date(s) Administered    COVID-19, MODERNA BLUE border, Primary or Immunocompromised, (age 12y+), IM, 100 mcg/0.5mL 2021, 2021    COVID-19, MODERNA Booster BLUE border, (age 18y+), IM, 50mcg/0.25mL 2022    Influenza, Adriane Tahir (age 72 y+), High Dose, 0.7mL 2022, 2022    Influenza, High Dose (Fluzone 65 yrs and older) 2016       Active Problems:  Patient Active Problem List   Diagnosis Code    [facility] R57.9       Isolation/Infection:   Isolation            No Isolation          Patient Infection Status       Infection Onset Added Last Indicated Last Indicated By Review Planned Expiration Resolved Resolved By    None active    Resolved    COVID-19 (Rule Out) 03/10/23 03/10/23 03/11/23 Respiratory Panel, Molecular, with COVID-19 (Restricted: peds pts or suitable admitted adults) (Ordered)   23 Rule-Out Test Resulted            Nurse Assessment:  Last Vital Signs: BP 95/66   Pulse 67   Temp 98.2 °F (36.8 °C) (Oral)   Resp 18   Ht 6' (1.829 m)   Wt 202 lb 3.2 oz (91.7 kg)   SpO2 98%   BMI 27.42 kg/m²     Last documented pain score (0-10 scale): Pain Level: 6  Last Weight:   Wt Readings from Last 1 Encounters:   23 202 lb 3.2 oz (91.7 kg)     Mental Status:  {IP PT MENTAL STATUS:}    IV Access:  {MH KAYLAH IV ACCESS:057799246}    Nursing Mobility/ADLs:  Walking   {CHP DME NWSD:877022476}  Transfer  {CHP DME QZAC:526827572}  Bathing  {CHP DME FPBC:257312592}  Dressing  {CHP DME ZMLE:809526577}  Toileting  {CHP DME RIYT:548120854}  Feeding  {CHP DME YAYV:305466839}  Med Admin  {CHP DME RZCI:286248765}  Med Delivery   { KAYLAH MED Delivery:835605515}    Wound Care Documentation and Therapy:  Wound 03/11/23 Sacrum (Active)   Wound Image   03/13/23 1230   Wound Etiology Pressure Unstageable 03/13/23 1230   Dressing Status Reinforced dressing 03/13/23 1230   Dressing/Treatment Triad hydro/zinc oxide-based hydrophilic paste;ABD 30/76/05 1230   Dressing Change Due 03/14/23 03/13/23 1230   Wound Length (cm) 10 cm 03/13/23 1230   Wound Width (cm) 14 cm 03/13/23 1230   Wound Surface Area (cm^2) 140 cm^2 03/13/23 1230   Change in Wound Size % (l*w) -2233.33 03/13/23 1230   Wound Assessment Eschar moist 03/13/23 1230   Drainage Amount Scant 03/13/23 1230   Drainage Description Serous 03/13/23 1230   Odor Mild 03/13/23 1230   Jazmine-wound Assessment Blanchable erythema 03/13/23 1230   Margins Attached edges 03/13/23 1230   Number of days: 2       Wound 03/11/23 Shoulder Right;Upper (Active)   Wound Etiology Traumatic 03/13/23 1230   Dressing Status Clean;Dry; Intact 03/12/23 2045   Dressing/Treatment Foam;Xeroform 03/12/23 2045   Dressing Change Due 03/12/23 03/12/23 0523   Wound Length (cm) 2 cm 03/11/23 2100   Wound Width (cm) 2 cm 03/11/23 2100   Wound Depth (cm) 0 cm 03/13/23 1230   Wound Surface Area (cm^2) 4 cm^2 03/11/23 2100   Wound Assessment [place]/red;Superficial 03/13/23 1230   Drainage Amount Scant 03/13/23 1230   Drainage Description Serous 03/13/23 1230   Odor None 03/13/23 1230   Jazmine-wound Assessment Blanchable erythema 03/13/23 1230   Margins Defined edges 03/13/23 1230   Wound Thickness Description not for Pressure Injury Partial thickness 03/13/23 1230   Number of days: 2       Wound 03/11/23 Tibial Proximal;Right (Active)   Number of days: 2       Wound 03/11/23 Knee Right (Active)   Wound Etiology Skin Tear 03/13/23 1230   Dressing/Treatment Open to air 03/12/23 2045   Wound Length (cm) 2 cm 03/11/23 2100   Wound Width (cm) 2 cm 03/11/23 2100   Wound Depth (cm) 0 cm 03/13/23 1230   Wound Surface Area (cm^2) 4 cm^2 03/11/23 2100   Wound Assessment Dry;Superficial 03/13/23 1230   Drainage Amount None 03/13/23 1230   Drainage Description Serous 03/13/23 1230   Odor None 03/13/23 1230   Jazmine-wound Assessment Blanchable erythema 03/13/23 1230   Margins Defined edges 03/13/23 1230   Wound Thickness Description not for Pressure Injury Partial thickness 03/13/23 1230   Number of days: 2       Wound 03/11/23 Toe (Comment  which one) Right;Plantar (Active)   Wound Etiology Traumatic 03/12/23 2045   Dressing/Treatment Open to air 03/12/23 2045   Wound Length (cm) 1 cm 03/11/23 2100   Wound Width (cm) 1 cm 03/11/23 2100   Wound Surface Area (cm^2) 1 cm^2 03/11/23 2100   Wound Assessment Subcutaneous; Superficial;Dry 03/12/23 2045   Drainage Amount None 03/12/23 2045   Odor None 03/12/23 2045   Jazmine-wound Assessment Blanchable erythema 03/12/23 2045   Margins Attached edges 03/12/23 2045   Number of days: 2       Wound 03/11/23 Chest Left;Medial abrasion noted under left nipple 2cm x 2 cm (Active)   Wound Etiology Skin Tear 03/13/23 1230   Dressing/Treatment Open to air 03/12/23 2045   Wound Length (cm) 3 cm 03/11/23 2100   Wound Assessment Dry;Superficial 03/13/23 1230   Odor None 03/13/23 1230   Jazmine-wound Assessment Blanchable erythema 03/13/23 1230   Margins Attached edges 03/13/23 1230   Wound Thickness Description not for Pressure Injury Partial thickness 03/13/23 1230   Number of days: 1       Wound 03/11/23 Elbow Left;Posterior;Right small dry scabbed healing areas 2cm x 2cm (Active)   Wound Etiology Skin Tear 03/13/23 1230   Dressing Status New dressing applied 03/13/23 1230   Dressing/Treatment Open to air 03/12/23 2045   Wound Assessment Dry;Superficial 03/13/23 1230   Drainage Amount None 03/13/23 1230   Odor None 03/13/23 1230   Jazmine-wound Assessment Blanchable erythema 03/13/23 1230   Margins Defined edges 03/13/23 1230   Number of days: 1       Wound 03/10/23 Back Medial;Upper (Active)   Wound Image   03/13/23 1230   Wound Etiology Pressure Unstageable 03/13/23 1230   Dressing Status Reinforced dressing 03/13/23 1230   Dressing/Treatment Triad hydro/zinc oxide-based hydrophilic paste;ABD 36/06/90 1230   Dressing Change Due 03/14/23 03/13/23 1230   Wound Length (cm) 12 cm 03/13/23 1230   Wound Width (cm) 22 cm 03/13/23 1230   Wound Surface Area (cm^2) 264 cm^2 03/13/23 1230   Wound Assessment Eschar moist;Pink/red 03/13/23 1230   Drainage Amount Scant 03/13/23 1230   Drainage Description Serous 03/13/23 1230   Odor None 03/13/23 1230   Jazmine-wound Assessment Ecchymosis 03/13/23 1230   Margins Undefined edges 03/13/23 1230   Wound Thickness Description not for Pressure Injury Full thickness 03/13/23 1230   Number of days: 3        Elimination:  Continence: Bowel: {YES / UF:31938}  Bladder: {YES / AY:22762}  Urinary Catheter: {Urinary Catheter:319286570}   Colostomy/Ileostomy/Ileal Conduit: {YES / CL:51041}       Date of Last BM: ***    Intake/Output Summary (Last 24 hours) at 3/13/2023 1551  Last data filed at 3/13/2023 0645  Gross per 24 hour   Intake --   Output 2925 ml   Net -2925 ml     I/O last 3 completed shifts:   In: 2522.9 [I.V.:2296.4; IV Piggyback:226.5]  Out: 1824 [Urine:3325]    Safety Concerns:     [employer] Safety Concerns:566364388}    Impairments/Disabilities:      [employer] Impairments/Disabilities:111480476}    Nutrition Therapy:  Current Nutrition Therapy:   [employer] Diet List:334701727}    Routes of Feeding: {CHP DME Other Feedings:213600407}  Liquids: {Slp liquid thickness:40875}  Daily Fluid Restriction: {CHP DME Yes amt example:089507723}  Last Modified Barium Swallow with Video (Video Swallowing Test): {Done Not Done WNXS:785423991}    Treatments at the Time of Hospital Discharge:   Respiratory Treatments: ***  Oxygen Therapy:  {Therapy; copd oxygen:87885}  Ventilator:    {[facility] Vent VRUO:009028669}    Rehab Therapies: {THERAPEUTIC INTERVENTION:7088911057}  Weight Bearing Status/Restrictions: { CC Weight Bearin}  Other Medical Equipment (for information only, NOT a DME order):  {EQUIPMENT:509119541}  Other Treatments: ***    Patient's personal belongings (please select all that are sent with patient):  {P DME Belongings:236687197}    RN SIGNATURE:  {Esignature:061373092}    CASE MANAGEMENT/SOCIAL WORK SECTION    Inpatient Status Date: 3/10/23    Readmission Risk Assessment Score:  Readmission Risk              Risk of Unplanned Readmission:  16           Discharging to Facility/ Agency   Name:   Address:  Phone:  Fax:    Dialysis Facility (if applicable)   Name:  Address:  Dialysis Schedule:  Phone:  Fax:    / signature: Electronically signed by BRETT Lang on 3/13/23 at 3:51 PM EDT    PHYSICIAN SECTION    Prognosis: {Prognosis:0492466340}    Condition at Discharge: David Acharya Braxton Patient Condition:069588064}    Rehab Potential (if transferring to Rehab): {Prognosis:7949800204}    Recommended Labs or Other Treatments After Discharge: ***    Physician Certification: I certify the above information and transfer of Angela Patel  is necessary for the continuing treatment of the diagnosis listed and that he requires {Admit to Appropriate Level of Care:47336} for {GREATER/LESS:449354723} 30 days.      Update Admission H&P: {CHP DME Changes in GJNHR:694311038}    PHYSICIAN SIGNATURE:  {Esignature:888873767}

## 2023-03-13 NOTE — PROGRESS NOTES
Wound Ostomy Continence Nurse  Consult Note       NAME:  Lincoln Fletcher  MEDICAL RECORD NUMBER:  [id number]  AGE: 78 y.o. GENDER: male  : 1940  TODAY'S DATE:  3/13/2023    Subjective   Reason for [address] Nurse Evaluation and Assessment: sacrum & upper back pressure injuries       Lincoln Fletcher is a 78 y.o. male referred by:   [x] Physician  [] Nursing  [] Other:     Wound Identification:  Wound Type: pressure  Contributing Factors: chronic pressure, decreased mobility, shear force, malnutrition, incontinence of stool, and incontinence of urine    Wound History: Patient admitted to [facility] with unstageable pressure injuries to the sacrum and mid/upper back. Scattered skin tears also present throughout the body and podiatry is on for LE/foot care  Current Wound Care Treatment:  Recommending 1) continue pressure injury prevention interventions, including low air-loss mattress 2) protective barrier cream with zinc for incontinence care, apply 2x daily and as needed 3) Dressing change to the sacrum and upper back wounds, Triad wound cream with ABD to loosely cover, perform 2x daily and as needed to promote wound healing and autolytic debridement     Patient Goal of Care:  [] Wound Healing  [] Odor Control  [x] Palliative Care  [] Pain Control   [] Other:         PAST MEDICAL HISTORY        Diagnosis Date    Ascending aorta dilatation (HCC)     Basal cell carcinoma     CKD (chronic kidney disease)     DVT (deep venous thrombosis) ([facility]        PAST SURGICAL HISTORY    History reviewed. No pertinent surgical history. FAMILY HISTORY    History reviewed. No pertinent family history. SOCIAL HISTORY    Social History     Tobacco Use    Smoking status: Former     Types: Cigarettes     Passive exposure: Past    Smokeless tobacco: Never   Substance Use Topics    Alcohol use:  Yes     Alcohol/week: 14.0 standard drinks     Types: 14 Cans of beer per week     Comment: 1-2 regular cans of beer a day    Drug use: Never       ALLERGIES    No Known Allergies    MEDICATIONS    No current facility-administered medications on file prior to encounter.      Current Outpatient Medications on File Prior to Encounter   Medication Sig Dispense Refill    triamterene-hydroCHLOROthiazide (DYAZIDE) 37.5-25 MG per capsule TAKE 1 CAPSULE BY MOUTH EVERY DAY      pravastatin (PRAVACHOL) 20 MG tablet TAKE 1 TABLET BY MOUTH EVERYDAY AT BEDTIME (Patient not taking: Reported on 3/11/2023)      metoprolol succinate (TOPROL XL) 25 MG extended release tablet Take by mouth daily      levothyroxine (SYNTHROID) 125 MCG tablet TAKE 1 TABLET BY MOUTH EVERY DAY      cyanocobalamin 1000 MCG/ML injection Inject into the muscle (Patient not taking: Reported on 3/11/2023)      aspirin 81 MG EC tablet Take by mouth daily (Patient not taking: Reported on 3/11/2023)      pravastatin (PRAVACHOL) 20 MG tablet TAKE 1 TABLET BY MOUTH EVERYDAY AT BEDTIME      ELIQUIS 5 MG TABS tablet TAKE 1 TABLET BY MOUTH TWICE A DAY         Objective    BP 95/66   Pulse 67   Temp 98.2 °F (36.8 °C) (Oral)   Resp 18   Ht 6' (1.829 m)   Wt 202 lb 3.2 oz (91.7 kg)   SpO2 98%   BMI 27.42 kg/m²     LABS:  WBC:    Lab Results   Component Value Date/Time    WBC 9.1 03/13/2023 04:06 AM     H/H:    Lab Results   Component Value Date/Time    HGB 10.4 03/13/2023 04:06 AM    HCT 30.4 03/13/2023 04:06 AM     PTT:    Lab Results   Component Value Date/Time    APTT 28.2 03/10/2023 02:30 PM   [APTT}  PT/INR:    Lab Results   Component Value Date/Time    PROTIME 15.1 03/10/2023 02:30 PM    INR 1.2 03/10/2023 02:30 PM     HgBA1c:  No results found for: LABA1C    Assessment   Silas Risk Score: Silas Scale Score: 6    Patient Active Problem List   Diagnosis    Shock ([facility]       Measurements:  Wound 03/11/23 Sacrum (Active)   Wound Image   03/13/23 1230   Wound Etiology Pressure Unstageable 03/13/23 1230   Dressing Status Reinforced dressing 03/13/23 1230   Dressing/Treatment Triad hydro/zinc oxide-based hydrophilic paste;ABD 12/20/71 1230   Dressing Change Due 03/14/23 03/13/23 1230   Wound Length (cm) 10 cm 03/13/23 1230   Wound Width (cm) 14 cm 03/13/23 1230   Wound Surface Area (cm^2) 140 cm^2 03/13/23 1230   Change in Wound Size % (l*w) -2233.33 03/13/23 1230   Wound Assessment Eschar moist 03/13/23 1230   Drainage Amount Scant 03/13/23 1230   Drainage Description Serous 03/13/23 1230   Odor Mild 03/13/23 1230   Jazmine-wound Assessment Blanchable erythema 03/13/23 1230   Margins Attached edges 03/13/23 1230   Number of days: 2       Wound 03/11/23 Shoulder Right;Upper (Active)   Wound Etiology Traumatic 03/13/23 1230   Dressing Status Clean;Dry; Intact 03/12/23 2045   Dressing/Treatment Foam;Xeroform 03/12/23 2045   Dressing Change Due 03/12/23 03/12/23 0523   Wound Length (cm) 2 cm 03/11/23 2100   Wound Width (cm) 2 cm 03/11/23 2100   Wound Depth (cm) 0 cm 03/13/23 1230   Wound Surface Area (cm^2) 4 cm^2 03/11/23 2100   Wound Assessment [place]/red;Superficial 03/13/23 1230   Drainage Amount Scant 03/13/23 1230   Drainage Description Serous 03/13/23 1230   Odor None 03/13/23 1230   Jazmine-wound Assessment Blanchable erythema 03/13/23 1230   Margins Defined edges 03/13/23 1230   Wound Thickness Description not for Pressure Injury Partial thickness 03/13/23 1230   Number of days: 2       Wound 03/11/23 Tibial Proximal;Right (Active)   Number of days: 2       Wound 03/11/23 Knee Right (Active)   Wound Etiology Skin Tear 03/13/23 1230   Dressing/Treatment Open to air 03/12/23 2045   Wound Length (cm) 2 cm 03/11/23 2100   Wound Width (cm) 2 cm 03/11/23 2100   Wound Depth (cm) 0 cm 03/13/23 1230   Wound Surface Area (cm^2) 4 cm^2 03/11/23 2100   Wound Assessment Dry;Superficial 03/13/23 1230   Drainage Amount None 03/13/23 1230   Drainage Description Serous 03/13/23 1230   Odor None 03/13/23 1230   Jazmine-wound Assessment Blanchable erythema 03/13/23 1230   Margins Defined edges 03/13/23 1230   Wound Thickness Description not for Pressure Injury Partial thickness 03/13/23 1230   Number of days: 2       Wound 03/11/23 Toe (Comment  which one) Right;Plantar (Active)   Wound Etiology Traumatic 03/12/23 2045   Dressing/Treatment Open to air 03/12/23 2045   Wound Length (cm) 1 cm 03/11/23 2100   Wound Width (cm) 1 cm 03/11/23 2100   Wound Surface Area (cm^2) 1 cm^2 03/11/23 2100   Wound Assessment Subcutaneous; Superficial;Dry 03/12/23 2045   Drainage Amount None 03/12/23 2045   Odor None 03/12/23 2045   Jazmine-wound Assessment Blanchable erythema 03/12/23 2045   Margins Attached edges 03/12/23 2045   Number of days: 2       Wound 03/11/23 Chest Left;Medial abrasion noted under left nipple 2cm x 2 cm (Active)   Wound Etiology Skin Tear 03/13/23 1230   Dressing/Treatment Open to air 03/12/23 2045   Wound Length (cm) 3 cm 03/11/23 2100   Wound Assessment Dry;Superficial 03/13/23 1230   Odor None 03/13/23 1230   Jazmine-wound Assessment Blanchable erythema 03/13/23 1230   Margins Attached edges 03/13/23 1230   Wound Thickness Description not for Pressure Injury Partial thickness 03/13/23 1230   Number of days: 1       Wound 03/11/23 Elbow Left;Posterior;Right small dry scabbed healing areas 2cm x 2cm (Active)   Wound Etiology Skin Tear 03/13/23 1230   Dressing Status New dressing applied 03/13/23 1230   Dressing/Treatment Open to air 03/12/23 2045   Wound Assessment Dry;Superficial 03/13/23 1230   Drainage Amount None 03/13/23 1230   Odor None 03/13/23 1230   Jazmine-wound Assessment Blanchable erythema 03/13/23 1230   Margins Defined edges 03/13/23 1230   Number of days: 1       Wound 03/10/23 Back Medial;Upper (Active)   Wound Image   03/13/23 1230   Wound Etiology Pressure Unstageable 03/13/23 1230   Dressing Status Reinforced dressing 03/13/23 1230   Dressing/Treatment Triad hydro/zinc oxide-based hydrophilic paste;ABD 08/17/67 1230   Dressing Change Due 03/14/23 03/13/23 1230   Wound Length (cm) 12 cm 03/13/23 1230   Wound Width (cm) 22 cm 03/13/23 1230   Wound Surface Area (cm^2) 264 cm^2 03/13/23 1230   Wound Assessment Eschar moist;Pink/red 03/13/23 1230   Drainage Amount Scant 03/13/23 1230   Drainage Description Serous 03/13/23 1230   Odor None 03/13/23 1230   Jazmine-wound Assessment Ecchymosis 03/13/23 1230   Margins Undefined edges 03/13/23 1230   Wound Thickness Description not for Pressure Injury Full thickness 03/13/23 1230   Number of days: 3     Assessment:    Upon entering the room, patient with scattered areas of bruising and abrasions throughout upper extremities and trunk. Patient with a large unstageable pressure injury to the sacrum - predominately moist eschar. Upper back with unstageable pressure Injury that is about 40% moist eschar and the remaining surrounding are is maroon and non-blanchable.     Plan   Plan of Care: Wound 03/10/23 Back Medial;Upper-Dressing/Treatment: Triad hydro/zinc oxide-based hydrophilic paste, ABD  Wound 03/11/23 Sacrum-Dressing/Treatment: Triad hydro/zinc oxide-based hydrophilic paste, ABD  Wound 03/11/23 Shoulder Right;Upper-Dressing/Treatment: Foam, Xeroform  Wound 03/11/23 Knee Right-Dressing/Treatment: Open to air  Wound 03/11/23 Toe (Comment  which one) Right;Plantar-Dressing/Treatment: Open to air  Wound 03/11/23 Chest Left;Medial abrasion noted under left nipple 2cm x 2 cm-Dressing/Treatment: Open to air  Wound 03/11/23 Elbow Left;Posterior;Right small dry scabbed healing areas 2cm x 2cm-Dressing/Treatment: Open to air    Specialty Bed Required : Yes   [x] Low Air Loss   [] Pressure Redistribution  [] Fluid Immersion  [] Bariatric  [] Other:     Current Diet: No diet orders on file  Dietician consult:  Yes    Discharge Plan:  Placement for patient upon discharge: skilled nursing    Patient appropriate for Outpatient [address]: Yes    Referrals:  []   [] [address] [facility]  [] Supplies  [] Other    Patient/Caregiver Teaching:  Level of patient/caregiver understanding able to:   [] Indicates understanding       [] Needs reinforcement  [] Unsuccessful      [] Verbal Understanding  [] Demonstrated understanding       [] No evidence of learning  [] Refused teaching         [x] N/A       Electronically signed by YOSSI Galvan, RN, Debbie Rice on 3/13/2023 at 2:03 PM

## 2023-03-13 NOTE — PROGRESS NOTES
Provider notified of critical K 2.8 and BUN 93. Replacement potassium was administered as ordered.   Morning BUN 89 was reported to KHADRA Cuello. K is better 3.7

## 2023-03-13 NOTE — CARE COORDINATION
MET W/ SON, KAYLEN IN PATIENT'S ROOM. PATIENT FROM HOME, ALONE, PREVIOUSLY INDEPENDENT. SON STATES PATIENT CARES FOR HIMSELF, COOKS MEALS, DRIVES. NO DME AT HOME. SON REPORTS PATIENT WAS NOT ANSWERING HIS CALLS AND WHEN HE WENT TO HIS HOUSE TO CHECK ON HIM, THERE WERE 4 NEWSPAPERS OUTSIDE HIS DOOR. SON STATES HIS FATHER GETS THE PAPER EVERY SINGLE DAY. PATIENT WAS FOUND IN HOME (ESTIMATED APPROX 3.5 DAYS) IN BATHROOM. PT HAD REPORTEDLY FALLEN AND WHEN HE GRABBED THE DOOR FOR SUPPORT, CLOSED IT AND LOCKED HIMSELF IN. PATIENT LIVES IN [facility] LEVEL WITH ONE BATHROOM UPSTAIRS AND ONE DOWNSTAIRS. ATTEMPTED TO DISCUSS D/C PLAN W/ SON. SON IS UNSURE AT THIS TIME D/T UNKNOWN PROGNOSIS OF HIS FATHER. PATIENT BEGAN OPENING HIS EYES AND TALKING WHILE I WAS IN THE ROOM. HE APPEARED TO BE SPEAKING SENTENCES BUT GARBLED SPEECH, UNABLE TO UNDERSTAND. CM/LSW TO FOLLOW FOR NEEDS.

## 2023-03-13 NOTE — PROGRESS NOTES
Physical Therapy Med Surg Initial Assessment  [facility]  [facility]       NAME: Lincoln Fletcher  : 1940 (78 y.o.)  MRN: [id number]  CODE STATUS: DNR-CC    Date of Service: 3/13/2023    Patient Diagnosis(es): Shock [facility]) [R57.9]  Disorientation [R41.0]  Elevated troponin [R77.8]  LEORA (acute kidney injury) ([facility] [N17.9]  Hypothermia, initial encounter [T68. XXXA]  Traumatic rhabdomyolysis, initial encounter ([facility] Kelin Gum. 6XXA]  Hypotension, unspecified hypotension type [I95.9]  Pneumonia of left lower lobe due to infectious organism [J18.9]   Chief Complaint   Patient presents with    Fall     Patient Active Problem List    Diagnosis Date Noted    Shock ([facility] 03/10/2023        Past Medical History:   Diagnosis Date    Ascending aorta dilatation (HCC)     Basal cell carcinoma     CKD (chronic kidney disease)     DVT (deep venous thrombosis) ([facility]      History reviewed. No pertinent surgical history. Patient assessed for rehabilitation services?: Yes  Family / Caregiver Present: No    Restrictions:  Restrictions/Precautions: NPO, Fall Risk     SUBJECTIVE:   Pain   Pre/post tx 0/10    Prior Level of Function:  Social/Functional History  Additional Comments: Pt unable to state PLOF.  Per chart, lives alone    OBJECTIVE:        Cognition:  Overall Orientation Status:  (Opens eyes to name)  Follows Commands: Impaired (difficulty following 1 step instructions consistently)  Overall Cognitive Status: Exceptions    Observation/Palpation  Observation: garbled speech; pt keeping eyes closed most of tx; pt with difficulty tracking with eyes; pt c/o whole body pain with PROM B LEs- pain resolved at rest    ROM:  RLE PROM: WFL (\"c/o pain and stiffness\")  LLE PROM: WFL (\"c/o pain and stiffness\")    Strength:  Strength RLE  Comment: functionally hip flexion 0/5, hip ext 1/5, knee ext 1/5, ankle DF 0/5  Strength LLE  Comment: functionally hip flexion 0/5, hip ext 1/5, knee ext 1/5, ankle DF 0/5  Strength Other  Other: core strength functionally 2/5    Neuro:  Balance  Sitting - Static:  (unsafe to assess)  Sitting - Dynamic:  (unsafe to assess)  Standing - Static:  (unsafe to assess)  Standing - Dynamic:  (unsafe to assess)     Bed mobility  Rolling to Left: Dependent/Total;2 Person assistance  Rolling to Right: Dependent/Total;2 Person assistance  Supine to Sit: Unable to assess  Sit to Supine: Unable to assess  Bed Mobility Comments: unsafe to assess sitting edge of bed due to decreased active movement and difficulty following 1 step instructions    Transfers  Comment: unsafe to assess    Ambulation  Comments: unsafe to assess    Activity Tolerance  Activity Tolerance: Patient limited by fatigue;Patient limited by pain; Patient limited by endurance;Treatment limited secondary to decreased cognition    Patient Education  Education Given To: Patient  Education Provided: Role of Therapy;Plan of Care  Education Method: Verbal  Education Outcome: Unable to demonstrate understanding       ASSESSMENT:   Body Structures, Functions, Activity Limitations Requiring Skilled Therapeutic Intervention: Decreased functional mobility ; Decreased ROM; Decreased strength;Decreased safe awareness;Decreased cognition;Decreased endurance;Decreased balance; Increased pain  Decision Making: High Complexity  History: high  Exam: high  Clinical Presentation: high    Therapy Prognosis: Fair      DISCHARGE RECOMMENDATIONS:  Discharge Recommendations: Continue to assess pending progress    Assessment: Pt is 78 y.o. male to hospital due to fall at home and possibly laying on floor for 2-3 days. Pt exhibits decreased ROM, strength, cognition with carryover to pt requiring two person assistance for all mobility. Continued PT is required to progress toward highest level of indep for decreased caregiver burden.   Requires PT Follow-Up: Yes       PLAN OF CARE:  Physcial Therapy Plan  General Plan:  (1-3 visits)  Current Treatment Recommendations: Strengthening, ROM, Balance training, Transfer training, Functional mobility training, Endurance training, Gait training, Neuromuscular re-education, Manual, Pain management, Home exercise program, Safety education & training, Patient/Caregiver education & training, Equipment evaluation, education, & procurement, Positioning, Therapeutic activities, Wheelchair mobility training    Safety Devices  Type of Devices: All fall risk precautions in place, Call light within reach, Bed alarm in place, Left in bed    Goals:  Short Term Goals  Short Term Goal 1: Pt will demonstrate rolling R/L mod A  Long Term Goals  Long Term Goal 1: Pt will demonstrate bed mobility mod A  Long Term Goal 2: Pt will demonstrate sitting edge of bed with B UE support and min A >/= 2 min  Long Term Goal 3: Pt will demonstrate sitting in bedside chair >/= 1 hr    AMPAC (6 CLICK) BASIC MOBILITY  AM-PAC Inpatient Mobility Raw Score : 6     Therapy Time:   Individual   Time In 0829   Time Out 0840   Minutes 11       Eval X 11 min    Aileen De Jesus PT, 03/13/23 at 9:09 AM         Definitions for assistance levels  Independent = pt does not require any physical supervision or assistance from another person for activity completion. Device may be needed.   Stand by assistance = pt requires verbal cues or instructions from another person, close to but not touching, to perform the activity  Minimal assistance= pt performs 75% or more of the activity; assistance is required to complete the activity  Moderate assistance= pt performs 50% of the activity; assistance is required to complete the activity  Maximal assistance = pt performs 25% of the activity; assistance is required to complete the activity  Dependent = pt requires total physical assistance to accomplish the task

## 2023-03-13 NOTE — CARE COORDINATION
Case Management Assessment  Initial Evaluation    Date/Time of Evaluation: 3/13/2023 2:25 PM  Assessment Completed by: Liya Alejandre RN    If patient is discharged prior to next notation, then this note serves as note for discharge by case management. Patient Name: Toni Vasquez                   YOB: 1940  Diagnosis: Shock ([facility] [R57.9]  Disorientation [R41.0]  Elevated troponin [R77.8]  LEORA (acute kidney injury) ([facility] [N17.9]  Hypothermia, initial encounter Aretha Cardenas. XXXA]  Traumatic rhabdomyolysis, initial encounter ([facility] Artist Tanner. 6XXA]  Hypotension, unspecified hypotension type [I95.9]  Pneumonia of left lower lobe due to infectious organism [J18.9]                   Date / Time: 3/10/2023  2:28 PM    Patient Admission Status: Inpatient   Readmission Risk (Low < 19, Mod (19-27), High > 27): Readmission Risk Score: 16.4    Current PCP: No primary care provider on file. PCP verified by CM? No (PT CONFUSED, GARBLED SPEECH. SON UNSURE)    Chart Reviewed: Yes      History Provided by: Child/Family  Patient Orientation: Person    Patient Cognition:  (GARBLED SPEECH UNABLE TO ASSESS AT [facility])    Hospitalization in the last 30 days (Readmission):  No    If yes, Readmission Assessment in CM Navigator will be completed. Advance Directives:      Code Status: DNR-CC   Patient's Primary Decision Maker is: Legal Next of Kin      Discharge Planning:    Patient lives with: Alone Type of Home: House  Primary Care Giver: Self  Patient Support Systems include: Children   Current Financial resources:    Current community resources:    Current services prior to admission: None            Current DME:              Type of Home Care services:  Nursing Services    ADLS  Prior functional level: Independent in ADLs/IADLs  Current functional level: Other (see comment) (PT IN BED FOR MULTIPLE DAYS, MINIMALLY RESPONSIVE PREVIOUS DAYS.  UNABLE TO ASSESS AT THIS TIME.)    PT AM-PAC: 6 /24  OT AM-PAC: 6 /24    Family can provide assistance at DC: Yes (SON LIVES IN [place]. WILLING TO RELOCATE HIMSELF, [address]  Would you like Case Management to discuss the discharge plan with any other family members/significant others, and if so, who? Yes (SONKAYLEN. PT DOES NOT HAVE OTHER CHILDREN/WIFE.)  Plans to Return to Present Housing: Unknown at present  Other Identified Issues/Barriers to RETURNING to current housing: NA  Potential Assistance needed at discharge: [facility]            Potential DME: Other (Comment) (unknown at this time)  Patient expects to discharge to: Long-term care  Plan for transportation at discharge:      Financial    Payor: Amari Faria / Plan: MEDICARE PART A AND B / Product Type: *No Product type* /     Does insurance require precert for SNF: No    Potential assistance Purchasing Medications: No  Meds-to-Beds request: Yes      [id number] [phone] [place] [address]  Phone: [phone] Fax: [phone]      Notes:    Factors facilitating achievement of predicted outcomes: Family support    Barriers to discharge: Medical complications    Additional Case Management Notes: SEE SEPARATE NOTE    The Plan for Transition of Care is related to the following treatment goals of Shock ([facility] [R57.9]  Disorientation [R41.0]  Elevated troponin [R77.8]  LEORA (acute kidney injury) ([address] [N17.9]  Hypothermia, initial encounter Cherylene Dub. XXXA]  Traumatic rhabdomyolysis, initial encounter ([facility] [facility] Hanks Lamp. 6XXA]  Hypotension, unspecified hypotension type [I95.9]  Pneumonia of left lower lobe due to infectious organism [N63.1]    IF APPLICABLE: The Patient and/or patient representative Dmitriy Oro and his family were provided with a choice of provider and agrees with the discharge plan.  Freedom of choice list with basic dialogue that supports the patient's individualized plan of care/goals and shares the quality data associated with the providers was provided to:     Patient Representative Name:       The Patient and/or Patient Representative Agree with the Discharge Plan?       Sherwin Vo RN  Case Management Department  Ph: [phone] Fax:

## 2023-03-13 NOTE — PROGRESS NOTES
[facility] OCCUPATIONAL THERAPY EVALUATION - ACUTE     NAME: Nga Chen  : 1940 (78 y.o.)  MRN: [id number]  CODE STATUS: DNR-CC  [facility]    Date of Service: 3/13/2023    Patient Diagnosis(es): Shock [facility]) [R57.9]  Disorientation [R41.0]  Elevated troponin [R77.8]  [facility] [N17.9]  Hypothermia, initial encounter Rajiv Kemp. XXXA]  Traumatic rhabdomyolysis, initial encounter [facility] Uzma Burt. 6XXA]  Hypotension, unspecified hypotension type [I95.9]  Pneumonia of left lower lobe due to infectious organism [J18.9]   Patient Active Problem List    Diagnosis Date Noted    [facility] 03/10/2023        Past Medical History:   Diagnosis Date    Ascending aorta dilatation (HCC)     Basal cell carcinoma     CKD (chronic kidney disease)     DVT (deep venous thrombosis) ([facility]      History reviewed. No pertinent surgical history. Restrictions  Restrictions/Precautions: NPO, Fall Risk       Safety Devices: Safety Devices  Type of Devices: All fall risk precautions in place;Call light within reach; Bed alarm in place; Left in bed     Patient's date of birth confirmed: Pt verbally unable, verified via wrist band    General:  Chart Reviewed: Yes  Patient assessed for rehabilitation services?: Yes    Subjective  Subjective: Pt unable to converse- significant limitations in speaking       Pain at start of treatment: Pt grimaces in pain with ranging of his legs    Pain at end of treatment: No    Location:   Description:   Nursing notified: Not Applicable  RN:   Intervention: Repositioned    Prior Level of Function:  Social/Functional History  Additional Comments: Pt unable to state PLOF.  Per chart, lives alone    OBJECTIVE:     Orientation Status:  Orientation  Overall Orientation Status:  (Opens eyes to name)    Observation:  Observation/Palpation  Posture: Fair    Cognition Status:  Cognition  Overall Cognitive Status: Exceptions  Arousal/Alertness: Delayed responses to stimuli  Following Commands: Inconsistently follows commands  Attention Span: Unable to maintain attention  Memory:  (Unable to assess)  Safety Judgement: Decreased awareness of need for assistance;Decreased awareness of need for safety  Problem Solving: Assistance required to generate solutions;Assistance required to implement solutions;Assistance required to identify errors made;Assistance required to correct errors made  Insights: Not aware of deficits  Initiation: Requires cues for all  Sequencing: Requires cues for all    Perception Status:  Perception  Overall Perceptual Status: Impaired  Initiation: Hand over hand to initiate tasks  Motor Planning: Hand over hand to sequence tasks    Vision and Hearing Status:  Hearing  Hearing:  ([facility])   Vision - Basic Assessment  Prior Vision:  (Unable to state)    GROSS ASSESSMENT AROM/PROM:  AROM: Generally decreased, functional       ROM:   LUE AROM (degrees)  LUE AROM : WFL  LUE General AROM: AAROM- does not follow commands to reach full ROM consistently but was able to assist to full ROM  Left Hand AROM (degrees)  Left Hand AROM: WFL  Left Hand General AROM: Spontaneously, not to command  RUE AROM (degrees)  RUE AROM : WFL  RUE General AROM: AAROM- does not follow commands to reach full ROM consistently but was able to assist to full ROM  Right Hand AROM (degrees)  Right Hand AROM: WFL  Right Hand General AROM: Spontaneously, not to command    UE STRENGTH:  Strength: Generally decreased, functional    UE COORDINATION:  Coordination: Generally decreased, functional    UE TONE:  Tone: Normal    UE SENSATION:  Sensation: Intact (Appears intact)    Hand Dominance:  Hand Dominance  Hand Dominance:  (Pt unable to state)    ADL Status:  ADL  Feeding: NPO  Feeding Skilled Clinical Factors: Anticipate dependent  Grooming: Dependent/Total  UE Bathing: Dependent/Total  LE Bathing: Dependent/Total  UE Dressing: Dependent/Total  LE Dressing: Dependent/Total  Toileting: Dependent/Total  Additional Comments: Simulated ADLs as above. Pt significantly limited in his ability to follow commands and initiate ADL tasks, unable to sit at [address] Transfer: Unable to assess  Toilet Transfers Comments: Anticipate dependent    Functional Mobility:    Transfers  Sit to stand: Unable to assess  Stand to sit: Unable to assess  Transfer Comments: Unsafe to come to EOB    Patient ambulated NT due to unsafe to progress to EOB      Bed Mobility  Bed mobility  Rolling to Left: Dependent/Total;2 Person assistance  Rolling to Right: Dependent/Total;2 Person assistance  Supine to Sit: Unable to assess  Sit to Supine: Unable to assess  Bed Mobility Comments: Unable to tolerate coming to EOB    Seated and Standing Balance:  Balance  Sitting:  (Unable to come to EOB; decreased sitting posture in bed)  Standing:  (Unable to safely progress)    Functional Endurance:  Activity Tolerance  Activity Tolerance: Patient limited by fatigue;Patient limited by pain;Treatment limited secondary to decreased cognition    D/C Recommendations:  OT D/C RECOMMENDATIONS  REQUIRES OT FOLLOW-UP: Yes    Equipment Recommendations:  OT Equipment Recommendations  Other: Continue to assess    OT Education:   Patient Education  Education Given To: Patient  Education Provided: Role of Therapy;Plan of Care  Education Method: Verbal  Barriers to Learning: Cognition  Education Outcome: Continued education needed    OT Follow Up:   OT D/C RECOMMENDATIONS  REQUIRES OT FOLLOW-UP: Yes       Assessment/Discharge Disposition:  Assessment: Pt is an 78year old man from home alone who [facility] to [facility] with the above deficits which impact his ability to perform ADLs and IADLs. Pt. limited in his ability to participate d/t fatigue and significant aphasia. Limited engagement with mobility. Pt would benefit from continued OT to maximize independence and safety with ADL tasks if he is able to participate.   Performance deficits / Impairments: Decreased functional mobility , Decreased ROM, Decreased endurance, Decreased strength, Decreased balance, Decreased ADL status, Decreased safe awareness, Decreased cognition, Decreased fine motor control, Decreased coordination, Decreased sensation, Decreased high-level IADLs, Decreased posture, Decreased vision/visual deficit  Prognosis: Fair  Discharge Recommendations: Continue to assess pending progress  Decision Making: High Complexity  History: Pt's medical history is moderately complex  Exam: Pt has 14 performance deficits  Assistance / Modification: Pt requires total A    AMPAC (Six Click) Self care Score   How much help is needed for putting on and taking off regular lower body clothing?: Total  How much help is needed for bathing (which includes washing, rinsing, drying)?: Total  How much help is needed for toileting (which includes using toilet, bedpan, or urinal)?: Total  How much help is needed for putting on and taking off regular upper body clothing?: Total  How much help is needed for taking care of personal grooming?: Total  How much help for eating meals?: Total  AM-PAC Inpatient Daily Activity Raw Score: 6  AM-PAC Inpatient ADL T-Scale Score : 17.07  ADL Inpatient CMS 0-100% Score: 100    Therapy key for assistance levels -   Independent/Mod I = Pt. is able to perform task with no assistance but may require a device   Stand by assistance = Pt. does not perform task at an independent level but does not need physical assistance, requires verbal cues  Minimal, Moderate, Maximal Assistance = Pt. requires physical assistance (25%, 50%, 75% assist from helper) for task but is able to actively participate in task   Dependent = Pt. requires total assistance with task and is not able to actively participate with task completion     Plan:  Occupational Therapy Plan  Times Per Week: 1-2 trial visits; POC to increase to 1-4x a week if pt able to participate in trial  Therapy Duration:  (Length of acute stay)  Current Treatment Recommendations: Balance training, Functional mobility training, Endurance training, Strengthening, Neuromuscular re-education, Cognitive reorientation, Safety education & training, Patient/Caregiver education & training, Pain management, Equipment evaluation, education, & procurement, Self-Care / ADL, Home management training, Cognitive/Perceptual training, Coordination training    Goals:   Patient will:    - Improve functional endurance to tolerate/complete 30 mins of ADL's  - Be Max A in UB ADLs   - Be Max A in LB ADLs  - Be Max A in ADL transfers without LOB  - Be Max A in toileting tasks  - Improve B hand fine motor coordination to [facility] in order to manage clothing fasteners/self-care containers in a timely manner  - Improve B UE Function (AROM, strength, motor control, tone normalization) to complete ADLs as projected. - Improve B UE strength and endurance to 3-/5 in order to participate in self-care activities as projected. - Access appropriate D/C site with as few architectural barriers as possible. - Sequence self-care tasks with 75% verbal cues for initiation    Patient Goal: Patient goals : Pt unable to state a goal\"     Discussed and agreed upon: No Comments: Family not present       Therapy Time:   Individual   Time In 0829   Time Out 0840   Minutes 11     Eval: 11 minutes     Electronically signed by:     KYA Alvarenga/L,   3/13/2023, 9:06 AM

## 2023-03-13 NOTE — PROGRESS NOTES
Chief Complaint   Patient presents with    Fall          Patient is a 78 y.o. male who presents with a chief complaint of fall. Patient is followed on a regular basis by Dr. Ronald Sawant primary care provider on file. Patient currently seen intensive care unit. Unable to provide any information due to mental status change, most of the information was obtained from the staff as well as chart. Apparently patient was found down on the ground after 4 days. Apparently he was independent up to few weeks ago and driving. Patient noted to be hypotensive as well as hypothermic on arrival to the emergency department. Does have history of chronic DVT on oral anticoagulation, hyperlipidemia, hypertension hypothyroidism, aortic stenosis as well as dilated ascending aorta. Noted to have total CK of 2700 and mildly elevated cardiac enzyme and a flat pattern potassium of three and acute kidney injury of 3.28 creatinine. Patient is DNR CCA. Status post echocardiogram on December 13, 2022 at [facility] with ejection fraction of 60%, dilated ascending aorta measuring 4.2 cm, mild aortic stenosis with a mean gradient of 14 mmHg. 3-13-23: Patient is resting comfort. No new events overnight     Past Medical History        Past Medical History:   Diagnosis Date    Ascending aorta dilatation (HCC)      Basal cell carcinoma      CKD (chronic kidney disease)      DVT (deep venous thrombosis) [facility])               Patient Active Problem List   Diagnosis    Shock [facility])         Past Surgical History   History reviewed. No pertinent surgical history. Social History   Social History            Socioeconomic History    Marital status:        Spouse name: None    Number of children: None    Years of education: None    Highest education level: None   Tobacco Use    Smoking status: Former       Types: Cigarettes       Passive exposure: Past    Smokeless tobacco: Never   Substance and Sexual Activity    Alcohol use:  Yes Alcohol/week: 14.0 standard drinks       Types: 14 Cans of beer per week       Comment: 1-2 regular cans of beer a day    Drug use: Never    Sexual activity: Never            Family History   History reviewed. No pertinent family history.         Current Facility-Administered Medications             Current Facility-Administered Medications   Medication Dose Route Frequency Provider Last Rate Last Admin    sodium chloride flush 0.9 % injection 5-40 mL  5-40 mL IntraVENous 2 times per day Stephanie Giron-Roche, APRN - CNP   10 mL at 03/12/23 0902    sodium chloride flush 0.9 % injection 5-40 mL  5-40 mL IntraVENous PRN Stephanie Christinen-Roche, APRN - CNP        0.9 % sodium chloride infusion   IntraVENous PRN Stephanie Giron-Roche, APRN - CNP        [Held by provider] ondansetron (ZOFRAN-ODT) disintegrating tablet 4 mg  4 mg Oral Q8H PRN Stephanie Giron-VICKY May - CNP         Or    [Held by provider] ondansetron (ZOFRAN) injection 4 mg  4 mg IntraVENous Q6H PRN Stephanie Giron-Roche, APRN - CNP        polyethylene glycol (GLYCOLAX) packet 17 g  17 g Oral Daily PRN Stephanie Giron-Roche, APRN - CNP        acetaminophen (TYLENOL) tablet 650 mg  650 mg Oral Q6H PRN Stephanie Giron-Roche, APRN - CNP         Or    acetaminophen (TYLENOL) suppository 650 mg  650 mg Rectal Q6H PRN Stephanie Giron-Roche, APRN - CNP        trimethobenzamide (TIGAN) injection 200 mg  200 mg IntraMUSCular Q6H PRN Stephanie Giron-Roche, APRN - CNP        dextrose 5 % and 0.45 % sodium chloride infusion   IntraVENous Continuous Angela Casey  mL/hr at 03/12/23 0526 Rate Verify at 03/12/23 0526    potassium chloride 20 mEq/50 mL IVPB (Central Line)  20 mEq IntraVENous PRN Angela Casey MD   Stopped at 03/11/23 1151    pantoprazole (PROTONIX) 40 mg in sodium chloride (PF) 0.9 % 10 mL injection  40 mg IntraVENous Q12H Guido Nava MD   40 mg at 03/12/23 0900    piperacillin-tazobactam (ZOSYN) 3,375 mg in sodium chloride 0.9 % 50 mL IVPB (mini-bag)  3,375 mg IntraVENous Q12H Eloina Mayberry MD 12.5 mL/hr at 03/12/23 0859 3,375 mg at 03/12/23 0859    fentaNYL (SUBLIMAZE) injection 25 mcg  25 mcg IntraVENous Q2H PRN VICKY Mcdaniel CNP   25 mcg at 03/12/23 0941    ammonium lactate (LAC-HYDRIN) 12 % lotion   Topical Daily Oliver Martell DPM   Given at 03/12/23 0901    dexmedetomidine (PRECEDEX) 1,000 mcg in sodium chloride 0.9 % 250 mL infusion  0.1-1.5 mcg/kg/hr IntraVENous Continuous Eloina Mayberry MD   Stopped at 03/12/23 0859    norepinephrine (LEVOPHED) 16 mg in sodium chloride 0.9 % 250 mL infusion  1-100 mcg/min IntraVENous Continuous Nitza Santillan DO   Stopped at 03/12/23 0900    enoxaparin (LOVENOX) injection 90 mg  1 mg/kg SubCUTAneous Daily VICKY Mason - CNP   90 mg at 03/12/23 0900    vancomycin (VANCOCIN) intermittent dosing (placeholder)   Other RX Placeholder Stephanie Winzaarabella-VICKY May - CNP                ALLERGIES: Patient has no known allergies. Review of Systems   Unable to perform ROS: Mental status change         VITALS:  Blood pressure 133/62, pulse 73, temperature 99.5 °F (37.5 °C), temperature source Bladder, resp. rate 24, height 6' (1.829 m), weight 195 lb 8.8 oz (88.7 kg), SpO2 96 %. Body mass index is 26.52 kg/m². Physical Exam  Vitals and nursing note reviewed. Constitutional:       Appearance: He is well-developed. He is not diaphoretic. HENT:      Head: Normocephalic and atraumatic. Eyes:      General: No scleral icterus. Conjunctiva/sclera: Conjunctivae normal.   Neck:      Thyroid: No thyromegaly. Vascular: No JVD. Trachea: No tracheal deviation. Cardiovascular:      Rate and Rhythm: Normal rate and regular rhythm. Chest Wall: PMI is not displaced. Pulses: Intact distal pulses. Heart sounds: Normal heart sounds. Heart sounds not distant. No murmur heard. No friction rub. No gallop. No S3 sounds. Pulmonary:      Effort: No respiratory distress. Breath sounds: No wheezing or rales. Chest:      Chest wall: No tenderness. Abdominal:      General: Bowel sounds are normal. There is no distension. Palpations: Abdomen is soft. There is no mass. Tenderness: There is no abdominal tenderness. There is no guarding or rebound. Musculoskeletal:         General: Normal range of motion. Cervical back: Neck supple. No tenderness. Skin:     General: Skin is warm and dry. Coloration: Skin is not pale. Findings: No erythema or rash. Neurological:      Mental Status: He is alert. He is disoriented. Cranial Nerves: No cranial nerve deficit.          LABS:  Recent Results         Recent Results (from the past 24 hour(s))   Troponin     Collection Time: 03/11/23  9:18 AM   Result Value Ref Range     Troponin 0.060 (HH) 0.000 - 0.010 ng/mL   CK     Collection Time: 03/11/23  9:19 AM   Result Value Ref Range     Total CK 1,850 (H) 0 - 190 U/L   POCT Glucose     Collection Time: 03/11/23 12:03 PM   Result Value Ref Range     POC Glucose 128 (H) 70 - 99 mg/dl     Performed on ACCU-CHEK     CK     Collection Time: 03/11/23  2:20 PM   Result Value Ref Range     Total CK 1,413 (H) 0 - 190 U/L   POCT Glucose     Collection Time: 03/11/23  3:55 PM   Result Value Ref Range     POC Glucose 169 (H) 70 - 99 mg/dl     Performed on ACCU-CHEK     Vancomycin Level, Random     Collection Time: 03/12/23 12:00 AM   Result Value Ref Range     Vancomycin Rm 10.0 10.0 - 40.0 ug/mL   Comprehensive Metabolic Panel     Collection Time: 03/12/23  6:00 AM   Result Value Ref Range     Sodium 155 (H) 135 - 144 mEq/L     Potassium 3.2 (L) 3.4 - 4.9 mEq/L     Chloride 121 (H) 95 - 107 mEq/L     CO2 21 20 - 31 mEq/L     Anion Gap 13 9 - 15 mEq/L     Glucose 218 (H) 70 - 99 mg/dL     BUN 92 (HH) 8 - 23 mg/dL     Creatinine 3.28 (H) 0.70 - 1.20 mg/dL     Est, Glom Filt Rate 17.7 (L) >60     Calcium 7.8 (L) 8.5 - 9.9 mg/dL     Total Protein 4.6 (L) 6.3 - 8.0 g/dL     Albumin 2.1 (L) 3.5 - 4.6 g/dL     Total Bilirubin 1.3 (H) 0.2 - 0.7 mg/dL     Alkaline Phosphatase 82 35 - 104 U/L      (H) 0 - 41 U/L     AST 90 (H) 0 - 40 U/L     Globulin 2.5 2.3 - 3.5 g/dL   Magnesium     Collection Time: 03/12/23  6:00 AM   Result Value Ref Range     Magnesium 2.3 1.7 - 2.4 mg/dL   CBC with Auto Differential     Collection Time: 03/12/23  6:00 AM   Result Value Ref Range     WBC 8.9 4.8 - 10.8 K/uL     RBC 3.20 (L) 4.70 - 6.10 M/uL     Hemoglobin 10.1 (L) 14.0 - 18.0 g/dL     Hematocrit 30.0 (L) 42.0 - 52.0 %     MCV 93.7 (H) 79.0 - 92.2 fL     MCH 31.6 (H) 27.0 - 31.3 pg     MCHC 33.7 33.0 - 37.0 %     RDW 14.5 11.5 - 14.5 %     Platelets 82 (L) 980 - 400 K/uL     Neutrophils % 90.0 %     Lymphocytes % 4.9 %     Monocytes % 5.0 %     Eosinophils % 0.0 %     Basophils % 0.1 %     Neutrophils Absolute 8.0 (H) 1.4 - 6.5 K/uL     Lymphocytes Absolute 0.4 (L) 1.0 - 4.8 K/uL     Monocytes Absolute 0.4 0.2 - 0.8 K/uL     Eosinophils Absolute 0.0 0.0 - 0.7 K/uL     Basophils Absolute 0.0 0.0 - 0.2 K/uL         Troponin:         Lab Results   Component Value Date/Time     TROPONINI 0.060 03/11/2023 09:18 AM         EKG: normal sinus rhythm, baseline artifact        ASSESSMENT:     Status post septic shock  Rhabdomyolysis  Elevated cardiac enzyme-demand ischemia  Acute kidney injury  Electrolyte abnormality-hypo--improved  Mild aortic stenosis  Aneurysmal ascending aorta measuring 4.2 cm  Primary hypertension  Hyperlipidemia  History of deep vein thrombosis. PLAN:   As always, aggressive risk factor modification is strongly recommended. We should adhere to the JNC VIII guidelines for HTN management and the NCEPATP III guidelines for LDL-C management  IV fluids  Avoid nephrotoxic agents  Monitor on telemetry  Maximize cardiac medications. Cardioprotective medications when blood pressure tolerates/off of pressors  Continue with full dose Lovenox for now given history of chronic DVT.   Transition back to oral anticoagulation prior to discharge  Sepsis treatment per primary care team/intensivist  Consider coronary evaluation based on clinical course. Patient with multiple risk factors for CAD. No need for repeat echocardiogram.  Recent echo in December 2023 with normal LV function mild aortic stenosis  GI/DVT prophylaxis  Maintain potassium between 4-5 magnesium greater than 2  Further recommendations to follow        Thank you for allowing me to participate in the care of your patient, please don't hesitate to contact me if you have any further questions. Greater than 30 minutes critical care time spent.

## 2023-03-13 NOTE — PLAN OF CARE
See OT evaluation for all goals and OT POC.  Electronically signed by KYA Kitchen/L on 3/13/2023 at 9:11 AM

## 2023-03-14 LAB
ANION GAP SERPL CALCULATED.3IONS-SCNC: 9 MEQ/L (ref 9–15)
BUN BLDV-MCNC: 81 MG/DL (ref 8–23)
CALCIUM SERPL-MCNC: 7.5 MG/DL (ref 8.5–9.9)
CHLORIDE BLD-SCNC: 117 MEQ/L (ref 95–107)
CO2: 19 MEQ/L (ref 20–31)
CREAT SERPL-MCNC: 2.92 MG/DL (ref 0.7–1.2)
GFR SERPL CREATININE-BSD FRML MDRD: 20.4 ML/MIN/{1.73_M2}
GLUCOSE BLD-MCNC: 156 MG/DL (ref 70–99)
POTASSIUM SERPL-SCNC: 3.5 MEQ/L (ref 3.4–4.9)
SODIUM BLD-SCNC: 145 MEQ/L (ref 135–144)
TOTAL CK: 229 U/L (ref 0–190)

## 2023-03-14 PROCEDURE — 6360000002 HC RX W HCPCS: Performed by: INTERNAL MEDICINE

## 2023-03-14 PROCEDURE — 80048 BASIC METABOLIC PNL TOTAL CA: CPT

## 2023-03-14 PROCEDURE — 36415 COLL VENOUS BLD VENIPUNCTURE: CPT

## 2023-03-14 PROCEDURE — 2700000000 HC OXYGEN THERAPY PER DAY

## 2023-03-14 PROCEDURE — 1210000000 HC MED SURG R&B

## 2023-03-14 PROCEDURE — 99232 SBSQ HOSP IP/OBS MODERATE 35: CPT | Performed by: INTERNAL MEDICINE

## 2023-03-14 PROCEDURE — 82550 ASSAY OF CK (CPK): CPT

## 2023-03-14 PROCEDURE — 2580000003 HC RX 258: Performed by: NURSE PRACTITIONER

## 2023-03-14 PROCEDURE — 2580000003 HC RX 258: Performed by: INTERNAL MEDICINE

## 2023-03-14 PROCEDURE — C9113 INJ PANTOPRAZOLE SODIUM, VIA: HCPCS | Performed by: INTERNAL MEDICINE

## 2023-03-14 PROCEDURE — A4216 STERILE WATER/SALINE, 10 ML: HCPCS | Performed by: INTERNAL MEDICINE

## 2023-03-14 PROCEDURE — 2500000003 HC RX 250 WO HCPCS: Performed by: INTERNAL MEDICINE

## 2023-03-14 PROCEDURE — APPSS30 APP SPLIT SHARED TIME 16-30 MINUTES: Performed by: PHYSICIAN ASSISTANT

## 2023-03-14 RX ADMIN — POTASSIUM CHLORIDE, DEXTROSE MONOHYDRATE AND SODIUM CHLORIDE: 150; 5; 200 INJECTION, SOLUTION INTRAVENOUS at 21:30

## 2023-03-14 RX ADMIN — SODIUM CHLORIDE, PRESERVATIVE FREE 10 ML: 5 INJECTION INTRAVENOUS at 08:39

## 2023-03-14 RX ADMIN — PIPERACILLIN AND TAZOBACTAM 3375 MG: 3; .375 INJECTION, POWDER, FOR SOLUTION INTRAVENOUS at 21:35

## 2023-03-14 RX ADMIN — POTASSIUM CHLORIDE, DEXTROSE MONOHYDRATE AND SODIUM CHLORIDE: 150; 5; 200 INJECTION, SOLUTION INTRAVENOUS at 08:36

## 2023-03-14 RX ADMIN — POTASSIUM CHLORIDE, DEXTROSE MONOHYDRATE AND SODIUM CHLORIDE: 150; 5; 200 INJECTION, SOLUTION INTRAVENOUS at 15:14

## 2023-03-14 RX ADMIN — PIPERACILLIN AND TAZOBACTAM 3375 MG: 3; .375 INJECTION, POWDER, FOR SOLUTION INTRAVENOUS at 08:38

## 2023-03-14 RX ADMIN — SODIUM CHLORIDE, PRESERVATIVE FREE 40 MG: 5 INJECTION INTRAVENOUS at 21:21

## 2023-03-14 RX ADMIN — SODIUM CHLORIDE, PRESERVATIVE FREE 40 MG: 5 INJECTION INTRAVENOUS at 11:41

## 2023-03-14 RX ADMIN — SODIUM CHLORIDE, PRESERVATIVE FREE 10 ML: 5 INJECTION INTRAVENOUS at 21:30

## 2023-03-14 RX ADMIN — Medication: at 08:42

## 2023-03-14 RX ADMIN — POTASSIUM CHLORIDE, DEXTROSE MONOHYDRATE AND SODIUM CHLORIDE: 150; 5; 200 INJECTION, SOLUTION INTRAVENOUS at 01:57

## 2023-03-14 ASSESSMENT — PAIN SCALES - WONG BAKER: WONGBAKER_NUMERICALRESPONSE: 0

## 2023-03-14 ASSESSMENT — PAIN SCALES - GENERAL: PAINLEVEL_OUTOF10: 0

## 2023-03-14 NOTE — CARE COORDINATION
CHRISTINEW met with the pt's son Candido Rae and he is ready to talk with hospice. [place] of choice offered and son chose [facility]. He would like his wife and son to be here for the meeting so he is requesting between 8 and 11 tomorrow morning. BRETT called Karen Serna at [facility] and she will arrange a meeting with the son and family tomorrow. Son is concerned about financial things as his father never gave him POA for financial issues.

## 2023-03-14 NOTE — FLOWSHEET NOTE
Pt is awake in room talking to people and not making any sense. Pt unable to do what is asked of him. Son in room and was talking to saff and made concerns that her would like a consult for hospice tomorrow  aware. Electronically signed by Vivek Holt LPN on 1/26/4080 at 38:44 AM  Perfect served doctor for a hospice consult per son request.  Pt had several visitors but unable to arry on conversation with them. Electronically signed by Vivek Holt LPN on 3/65/1397 at 04:63 PM  Repositioned pt in bed garcia draining cloudy yellowish urine better then this AM when it was tea color. When ny care is being done he hit at staff. Electronically signed by Vivek Holt LPN on 8/43/4218 at 1:49 PM  Pt remains quite in bed son left and pt continues to talk to people who are not there. Garcia emptied for 900cc. Kali Valadez Electronically signed by Vivek Holt LPN on 1/03/9187 at 6:93 PM

## 2023-03-14 NOTE — PROGRESS NOTES
Hospitalist Progress Note      Date of Admission: 3/10/2023  Chief Complaint:    Chief Complaint   Patient presents with    Fall     Subjective:  No new complaints.   No nausea, vomiting, chest pain, or headache      Medications:    Infusion Medications    dextrose 5% and 0.2% NaCl with KCl 20 mEq 150 mL/hr at 03/13/23 1900    sodium chloride       Scheduled Medications    sodium chloride flush  5-40 mL IntraVENous 2 times per day    pantoprazole (PROTONIX) 40 mg injection  40 mg IntraVENous Q12H    piperacillin-tazobactam  3,375 mg IntraVENous Q12H    ammonium lactate   Topical Daily    enoxaparin  1 mg/kg SubCUTAneous Daily     PRN Meds: sodium chloride flush, sodium chloride, [Held by provider] ondansetron **OR** [Held by provider] ondansetron, polyethylene glycol, acetaminophen **OR** acetaminophen, trimethobenzamide    Intake/Output Summary (Last 24 hours) at 3/14/2023 0009  Last data filed at 3/13/2023 1903  Gross per 24 hour   Intake --   Output 3350 ml   Net -3350 ml     Exam:  BP (!) 120/44   Pulse 61   Temp 97.9 °F (36.6 °C) (Axillary)   Resp 18   Ht 6' (1.829 m)   Wt 202 lb 3.2 oz (91.7 kg)   SpO2 96%   BMI 27.42 kg/m²   Head: Normocephalic, atraumatic  Sclera clear  Neck JVD flat  Lungs: normal effort of breathing    Labs:   Recent Labs     03/11/23  0345 03/12/23  0600 03/13/23  0406   WBC 8.7 8.9 9.1   HGB 11.6* 10.1* 10.4*   HCT 34.5* 30.0* 30.4*   PLT 99* 82* 75*     Recent Labs     03/11/23  0037 03/11/23  0037 03/11/23  0345 03/12/23  0600 03/12/23  1751 03/13/23  0406   NA  --    < > 150* 155* 150* 151*   K  --    < > 3.0* 3.2* 2.8* 3.7   CL  --    < > 114* 121* 119* 119*   CO2  --    < > 19* 21 21 21   BUN  --    < > 92* 92* 93* 89*   CREATININE  --    < > 2.90* 3.28* 3.11* 3.18*   CALCIUM  --    < > 7.6* 7.8* 7.6* 7.5*   AST  --   --  146* 90*  --  82*   ALT  --   --  291* 198*  --  161*   BILIDIR 1.3*  --   --   --   --   --    BILITOT 2.0*  --  1.9* 1.3*  --  1.1*   ALKPHOS  --   --  99 82  --  106*    < > = values in this interval not displayed. No results for input(s): INR in the last 72 hours. Recent Labs     03/11/23  0345 03/11/23  0704 03/11/23  0918 03/11/23  0919 03/12/23  2240 03/13/23  0406 03/13/23  0919   CKTOTAL  --    < >  --    < > 608* 448* 394*   TROPONINI 0.056*  --  0.060*  --   --   --   --     < > = values in this interval not displayed. Radiology:  XR CHEST PORTABLE   Final Result   Right internal jugular line tip in the proximal SVC. No pneumothorax. XR FEMUR RIGHT (MIN 2 VIEWS)   Final Result   No acute osseous abnormality. XR HIP 2-3 VW W PELVIS RIGHT   Final Result   No acute abnormality of the pelvis and right hip. CT Head W/O Contrast   Final Result   CT HEAD WITHOUT CONTRAST:      1. No skull fracture or acute intracranial abnormality. CT CERVICAL SPINE WITHOUT CONTRAST:      1. No fracture or joint dislocation is seen in the cervical spine. 2. Mild age indeterminate compression fracture deformity in the T1 vertebral   body. If indicated, MRI may be obtained for further evaluation. 3. Degenerative changes, as described. CT CERVICAL SPINE WO CONTRAST   Final Result   CT HEAD WITHOUT CONTRAST:      1. No skull fracture or acute intracranial abnormality. CT CERVICAL SPINE WITHOUT CONTRAST:      1. No fracture or joint dislocation is seen in the cervical spine. 2. Mild age indeterminate compression fracture deformity in the T1 vertebral   body. If indicated, MRI may be obtained for further evaluation. 3. Degenerative changes, as described. CT THORACIC SPINE WO CONTRAST   Final Result   No evidence of acute thoracic spine trauma. CT LUMBAR SPINE WO CONTRAST   Final Result   No evidence of acute lumbar spine trauma. Multilevel degenerative changes. CT CHEST WO CONTRAST   Final Result   Atraumatic appearance of the chest.      Patchy right upper lobe opacities consistent with pneumonia. CT ABDOMEN PELVIS WO CONTRAST Additional Contrast? None   Final Result   Atraumatic appearance of the abdomen and pelvis. Prostatomegaly. Right renal atrophy. Assessment/Plan:    Rhabdomyolysis secondary to fall: Substantially improved. Acute kidney injury: Slow to improve. We will need to watch closely with nephrology who is primarily managing. Elevated troponin: Appreciate cardiology consult. Cardiology primarily reviewing and managing troponin. HTN: monitor BP, adjust meds as needed    Case discussed with son in person.     35 minutes total care time, >1/2 in unit/floor time and care coordination        Winifred Merida MD ,MD

## 2023-03-14 NOTE — CARE COORDINATION
RECEIVED MESSAGE FROM INESSA FROM [facility] INFORMING THAT PT'S SON KAYLEN IS NOT READY FOR HOSPICE AT THIS TIME. SON WOULD LIKE TO SEE IF PT SHOWS SIGNS OF IMPROVEMENT.

## 2023-03-14 NOTE — CONSULTS
This RN left voicemail with pt's son to confirm referral meeting for Wednesday at [phone].  Will update once son confirms meeting with [phone].    1028 confirmed meeting for 3/15/23 at 80 with pt's son

## 2023-03-14 NOTE — PROGRESS NOTES
Physical Therapy Missed Treatment   Facility/Department: [facility] MED SURG [id number]    NAME: Jesus Winter    : 1940 (78 y.o.)  MRN: [id number]    Account: [de-identified]  Gender: male    Chart reviewed, attempted PT at 10:50. Patient unavailable 2° to:    [x] Hold per family. Pt is going hospice. He's not responding to any commands. [] Pt declined     [] Nsg notified   [] Other notified    [] Pt. . off floor for test/procedure. [] Pt. Unavailable       Will attempt PT treatment again at earliest convenience.       Electronically signed by Lola Borja PTA on 3/14/23 at 10:59 AM EDT

## 2023-03-14 NOTE — PROGRESS NOTES
INPATIENT PROGRESS NOTES    PATIENT NAME: Andrew Kim  MRN: [id number]  SERVICE DATE:  March 14, 2023   SERVICE TIME:  4:57 PM      PRIMARY SERVICE: Pulmonary Disease    CHIEF COMPLAIN: Septic shock      INTERVAL HPI: Patient seen and examined at bedside, Interval Notes, orders reviewed. Nursing notes noted  Patient is talking and much more awake but very confused. Patient's son at bedside. Patient's family is going to have a meeting with hospice for information. He is afebrile. He is on 3 L O2 via nasal cannula O2 saturation 97%. He is on broad-spectrum antibiotic with IV Zosyn. According to son he does not want to go to nursing home and he cannot stay alone anymore. OBJECTIVE    Body mass index is 28.4 kg/m². PHYSICAL EXAM:  Vitals:  BP (!) 123/49   Pulse 68   Temp 98.2 °F (36.8 °C) (Axillary)   Resp (!) 8   Ht 6' (1.829 m)   Wt 209 lb 7 oz (95 kg)   SpO2 97%   BMI 28.40 kg/m²   General: Confused, more awake today comfortable in bed, No distress. Head: Atraumatic , Normocephalic   Eyes: PERRL. No sclera icterus. No conjunctival injection. No discharge   ENT: No nasal  discharge. Pharynx clear. Neck:  Trachea midline. No thyromegaly, no JVD, No cervical adenopathy. Chest : Bilaterally symmetrical ,Normal effort,  No accessory muscle use  Lung : Diminished BS bilateral, decreased BS at bases. No Rales. No wheezing. No rhonchi. Heart[de-identified] Normal  rate. Regular rhythm. No mumur ,  Rub or gallop  ABD: Non-tender. Non-distended. No masses. No organmegaly. Normal bowel sounds. No hernia.   Ext : No Pitting both leg , No Cyanosis No clubbing  Neuro: no focal weakness          DATA:   Recent Labs     03/12/23  0600 03/13/23  0406   WBC 8.9 9.1   HGB 10.1* 10.4*   HCT 30.0* 30.4*   MCV 93.7* 93.5*   PLT 82* 75*     Recent Labs     03/12/23  0600 03/12/23  1751 03/13/23  0406 03/14/23  0510   *   < > 151* 145*   K 3.2*   < > 3.7 3.5   *   < > 119* 117*   CO2 21   < > 21 19*   BUN 92*   < > 89* 81*   CREATININE 3.28*   < > 3.18* 2.92*   GLUCOSE 218*   < > 165* 156*   CALCIUM 7.8*   < > 7.5* 7.5*   PROT 4.6*  --  4.4*  --    LABALBU 2.1*  --  1.9*  --    BILITOT 1.3*  --  1.1*  --    ALKPHOS 82  --  106*  --    AST 90*  --  82*  --    *  --  161*  --    LABGLOM 17.7*   < > 18.4* 20.4*   GLOB 2.5  --  2.5  --     < > = values in this interval not displayed. MV Settings:          No results for input(s): PHART, GTP2GJD, PO2ART, EBZ8MLC, BEART, M2WOVCKI in the last 72 hours. O2 Device: Nasal cannula  O2 Flow Rate (L/min): 2 L/min    No diet orders on file     MEDICATIONS during current hospitalization:    Continuous Infusions:   dextrose 5% and 0.2% NaCl with KCl 20 mEq 150 mL/hr at 03/14/23 1514    sodium chloride         Scheduled Meds:   sodium chloride flush  5-40 mL IntraVENous 2 times per day    pantoprazole (PROTONIX) 40 mg injection  40 mg IntraVENous Q12H    piperacillin-tazobactam  3,375 mg IntraVENous Q12H    ammonium lactate   Topical Daily    enoxaparin  1 mg/kg SubCUTAneous Daily       PRN Meds:sodium chloride flush, sodium chloride, [Held by provider] ondansetron **OR** [Held by provider] ondansetron, polyethylene glycol, acetaminophen **OR** acetaminophen, trimethobenzamide    Radiology  CT ABDOMEN PELVIS WO CONTRAST Additional Contrast? None    Result Date: 3/10/2023  EXAMINATION: CT OF THE ABDOMEN AND PELVIS WITHOUT CONTRAST 3/10/2023 4:54 pm TECHNIQUE: CT of the abdomen and pelvis was performed without the administration of intravenous contrast. Multiplanar reformatted images are provided for review. Automated exposure control, iterative reconstruction, and/or weight based adjustment of the mA/kV was utilized to reduce the radiation dose to as low as reasonably achievable. COMPARISON: None.  HISTORY: ORDERING SYSTEM PROVIDED HISTORY: fall TECHNOLOGIST PROVIDED HISTORY: Reason for exam:->fall Additional Contrast?->None Decision Support Exception - unselect if not a suspected or confirmed emergency medical condition->Emergency Medical Condition ([place]) What reading provider will be dictating this exam?->CRC FINDINGS: Lower Chest: Left lower lobe opacity consistent with atelectasis although pneumonia not totally excludable. Organs: The liver, spleen, adrenal glands, pancreas are normal.  Gallbladder is mildly distended with gallbladder sludge. Right renal atrophy. GI/Bowel: Diffuse colonic fecal retention. Normal small bowel and appendix. Pelvis: Prostatomegaly. Arce catheter in a decompressed urinary bladder. Peritoneum/Retroperitoneum: No free fluid or free air. Bones/Soft Tissues:   Right inguinal hernia containing normal loops of bowel. Degenerative changes lumbar spine. No fractures. Atraumatic appearance of the abdomen and pelvis. Prostatomegaly. Right renal atrophy. XR FEMUR RIGHT (MIN 2 VIEWS)    Result Date: 3/10/2023  EXAMINATION: XRAY VIEWS OF THE RIGHT FEMUR 3/10/2023 5:08 pm COMPARISON: None. HISTORY: ORDERING SYSTEM PROVIDED HISTORY: fall TECHNOLOGIST PROVIDED HISTORY: Reason for exam:->fall What reading provider will be dictating this exam?->CRC FINDINGS: There is no evidence of acute fracture. There is normal alignment. No acute joint abnormality. No focal osseous lesion. No focal soft tissue abnormality. No acute osseous abnormality. CT Head W/O Contrast    Result Date: 3/10/2023  EXAMINATION: CT OF THE HEAD WITHOUT CONTRAST; CT OF THE CERVICAL SPINE WITHOUT CONTRAST 3/10/2023 4:54 pm TECHNIQUE: CT of the head was performed without the administration of intravenous contrast. Automated exposure control, iterative reconstruction, and/or weight based adjustment of the mA/kV was utilized to reduce the radiation dose to as low as reasonably achievable.; CT of the cervical spine was performed without the administration of intravenous contrast. Multiplanar reformatted images are provided for review.  Automated exposure control, iterative reconstruction, and/or weight based adjustment of the mA/kV was utilized to reduce the radiation dose to as low as reasonably achievable. COMPARISON: None. HISTORY: ORDERING SYSTEM PROVIDED HISTORY: fall, confusion TECHNOLOGIST PROVIDED HISTORY: Reason for exam:->fall, confusion Has a \"code stroke\" or \"stroke alert\" been called? ->No Decision Support Exception - unselect if not a suspected or confirmed emergency medical condition->Emergency Medical Condition ([place]) What reading provider will be dictating this exam?->CRC FINDINGS: CT OF THE BRAIN: BRAIN/VENTRICLES: No mass effect, edema or hemorrhage is seen. Mild volume loss is seen in the cerebrum with mild chronic microvascular ischemic changes. No hydrocephalus or extra-axial fluid is seen. ORBITS: Prosthetic lenses are seen in the globes bilaterally. The orbits are otherwise grossly unremarkable. The the SINUSES: Mild mucosal thickening is seen in the paranasal sinuses. Small effusion in the right mastoid air cells. The left mastoid air cells are clear. SOFT TISSUES/SKULL: No acute abnormality of the visualized skull or soft tissues. CT CERVICAL SPINE: BONES/ALIGNMENT: No fracture or joint dislocation is seen in the cervical spine. Mild age indeterminate compression fracture deformity along the superior endplate of the T1 vertebral body. DEGENERATIVE CHANGES: Moderate loss of disc heights at C5-6 and C6-7. Mild central canal scratch the small disc bulge results in mild central canal stenosis at C3-4. Multilevel facet and uncovertebral joint hypertrophic changes resulting in variable degrees of neural foraminal stenoses, worst (moderate to severe) at the left C3-4 level. SOFT TISSUES: There is no prevertebral soft tissue swelling. CT HEAD WITHOUT CONTRAST: 1. No skull fracture or acute intracranial abnormality. CT CERVICAL SPINE WITHOUT CONTRAST: 1. No fracture or joint dislocation is seen in the cervical spine.  2. Mild age indeterminate compression fracture deformity in the T1 vertebral body. If indicated, MRI may be obtained for further evaluation. 3. Degenerative changes, as described. CT CHEST WO CONTRAST    Result Date: 3/10/2023  EXAMINATION: CT OF THE CHEST WITHOUT CONTRAST 3/10/2023 4:54 pm TECHNIQUE: CT of the chest was performed without the administration of intravenous contrast. Multiplanar reformatted images are provided for review. Automated exposure control, iterative reconstruction, and/or weight based adjustment of the mA/kV was utilized to reduce the radiation dose to as low as reasonably achievable. COMPARISON: None. HISTORY: ORDERING SYSTEM PROVIDED HISTORY: fall TECHNOLOGIST PROVIDED HISTORY: Reason for exam:->fall Decision Support Exception - unselect if not a suspected or confirmed emergency medical condition->Emergency Medical Condition ([place]) What reading provider will be dictating this exam?->CRC FINDINGS: Mediastinum: Thoracic aorta is unremarkable. Heart and pericardium are normal.  Calcified plaque involving the coronary arteries. No significant mediastinal adenopathy. Lungs/pleura: Patchy right upper lobe lung opacities consistent with pneumonia. Bibasilar atelectasis. Upper Abdomen:   Gallbladder sludge. Soft Tissues/Bones: Fractures. Atraumatic appearance of the chest. Patchy right upper lobe opacities consistent with pneumonia. CT CERVICAL SPINE WO CONTRAST    Result Date: 3/10/2023  EXAMINATION: CT OF THE HEAD WITHOUT CONTRAST; CT OF THE CERVICAL SPINE WITHOUT CONTRAST 3/10/2023 4:54 pm TECHNIQUE: CT of the head was performed without the administration of intravenous contrast. Automated exposure control, iterative reconstruction, and/or weight based adjustment of the mA/kV was utilized to reduce the radiation dose to as low as reasonably achievable.; CT of the cervical spine was performed without the administration of intravenous contrast. Multiplanar reformatted images are provided for review.  Automated exposure control, iterative reconstruction, and/or weight based adjustment of the mA/kV was utilized to reduce the radiation dose to as low as reasonably achievable. COMPARISON: None. HISTORY: ORDERING SYSTEM PROVIDED HISTORY: fall, confusion TECHNOLOGIST PROVIDED HISTORY: Reason for exam:->fall, confusion Has a \"code stroke\" or \"stroke alert\" been called? ->No Decision Support Exception - unselect if not a suspected or confirmed emergency medical condition->Emergency Medical Condition ([place]) What reading provider will be dictating this exam?->CRC FINDINGS: CT OF THE BRAIN: BRAIN/VENTRICLES: No mass effect, edema or hemorrhage is seen. Mild volume loss is seen in the cerebrum with mild chronic microvascular ischemic changes. No hydrocephalus or extra-axial fluid is seen. ORBITS: Prosthetic lenses are seen in the globes bilaterally. The orbits are otherwise grossly unremarkable. The the SINUSES: Mild mucosal thickening is seen in the paranasal sinuses. Small effusion in the right mastoid air cells. The left mastoid air cells are clear. SOFT TISSUES/SKULL: No acute abnormality of the visualized skull or soft tissues. CT CERVICAL SPINE: BONES/ALIGNMENT: No fracture or joint dislocation is seen in the cervical spine. Mild age indeterminate compression fracture deformity along the superior endplate of the T1 vertebral body. DEGENERATIVE CHANGES: Moderate loss of disc heights at C5-6 and C6-7. Mild central canal scratch the small disc bulge results in mild central canal stenosis at C3-4. Multilevel facet and uncovertebral joint hypertrophic changes resulting in variable degrees of neural foraminal stenoses, worst (moderate to severe) at the left C3-4 level. SOFT TISSUES: There is no prevertebral soft tissue swelling. CT HEAD WITHOUT CONTRAST: 1. No skull fracture or acute intracranial abnormality. CT CERVICAL SPINE WITHOUT CONTRAST: 1. No fracture or joint dislocation is seen in the cervical spine.  2. Mild age indeterminate compression fracture deformity in the T1 vertebral body. If indicated, MRI may be obtained for further evaluation. 3. Degenerative changes, as described. CT THORACIC SPINE WO CONTRAST    Result Date: 3/10/2023  EXAMINATION: CT OF THE THORACIC SPINE WITHOUT CONTRAST  3/10/2023 4:54 pm: TECHNIQUE: CT of the thoracic spine was performed without the administration of intravenous contrast. Multiplanar reformatted images are provided for review. Automated exposure control, iterative reconstruction, and/or weight based adjustment of the mA/kV was utilized to reduce the radiation dose to as low as reasonably achievable. COMPARISON: None. HISTORY: ORDERING SYSTEM PROVIDED HISTORY: fall confusion TECHNOLOGIST PROVIDED HISTORY: Reason for exam:->fall confusion What reading provider will be dictating this exam?->CRC FINDINGS: BONES/ALIGNMENT: There is normal alignment of the spine. The vertebral body heights are maintained. No osseous destructive lesion is seen. DEGENERATIVE CHANGES: Multilevel degenerative changes. SOFT TISSUES: No paraspinal mass is seen. MISCELLANEOUS: Bilateral pulmonary opacities. No evidence of acute thoracic spine trauma. CT LUMBAR SPINE WO CONTRAST    Result Date: 3/10/2023  EXAMINATION: CT OF THE LUMBAR SPINE WITHOUT CONTRAST  3/10/2023 TECHNIQUE: CT of the lumbar spine was performed without the administration of intravenous contrast. Multiplanar reformatted images are provided for review. Adjustment of mA and/or kV according to patient size was utilized. Automated exposure control, iterative reconstruction, and/or weight based adjustment of the mA/kV was utilized to reduce the radiation dose to as low as reasonably achievable.  COMPARISON: None HISTORY: ORDERING SYSTEM PROVIDED HISTORY: fall confusion TECHNOLOGIST PROVIDED HISTORY: Reason for exam:->fall confusion Decision Support Exception - unselect if not a suspected or confirmed emergency medical condition->Emergency Medical Condition ([place]) What reading provider will be dictating this exam?->CRC FINDINGS: BONES/ALIGNMENT: There is normal alignment of the spine. The vertebral body heights are maintained. No osseous destructive lesion is seen. DEGENERATIVE CHANGES: Multilevel degenerative changes there is is SOFT TISSUES/RETROPERITONEUM: No paraspinal mass is seen. No evidence of acute lumbar spine trauma. Multilevel degenerative changes. XR CHEST PORTABLE    Result Date: 3/10/2023  EXAMINATION: ONE XRAY VIEW OF THE CHEST 3/10/2023 7:13 pm COMPARISON: None. HISTORY: ORDERING SYSTEM PROVIDED HISTORY: central line TECHNOLOGIST PROVIDED HISTORY: Reason for exam:->central line What reading provider will be dictating this exam?->CRC FINDINGS: The lungs are without acute focal process. There is no effusion or pneumothorax. The cardiomediastinal silhouette is without acute process. The osseous structures are without acute process. Right internal jugular line tip in the proximal SVC. Right internal jugular line tip in the proximal SVC. No pneumothorax. XR HIP 2-3 VW W PELVIS RIGHT    Result Date: 3/10/2023  EXAMINATION: ONE XRAY VIEW OF THE PELVIS AND TWO XRAY VIEWS RIGHT HIP 3/10/2023 5:08 pm COMPARISON: None. HISTORY: ORDERING SYSTEM PROVIDED HISTORY: fall TECHNOLOGIST PROVIDED HISTORY: Reason for exam:->fall What reading provider will be dictating this exam?->CRC FINDINGS: The bones are demineralized. No evidence of pelvic fracture. Bilateral hips demonstrate normal alignment. No focal osseous lesion. SI joints are symmetric. Arce catheter in place. No acute abnormality of the pelvis and right hip. IMPRESSION AND SUGGESTION:  Septic shock due to bacteremia. Gram-negative pamela bacteremia. Urinary tract infection  Toxic metabolic encephalopathy. Slightly better  Acute renal failure due to shock. Hypernatremia. Worse today. Severe hypokalemia. Improved slightly  Metabolic  acidosis. Elevated LFTs.     Discussed with son at bedside. Hospice meeting tomorrow . continue O2 to keep saturation 90% or above. Kidney function  is worsening. Nephrology is following. Continue broad-spectrum antibiotic. Continue present treatment plan. Family to decide further plan after hospice meeting. NOTE: This report was transcribed using voice recognition software. Every effort was made to ensure accuracy; however, inadvertent computerized transcription errors may be present.       Electronically signed by Linn Ring MD, FCCP on 3/14/2023 at 4:57 PM

## 2023-03-14 NOTE — PROGRESS NOTES
Hospitalist Progress Note      Date of Admission: 3/10/2023  Chief Complaint:    Chief Complaint   Patient presents with    Fall     Subjective:  No new complaints. No nausea, vomiting, chest pain, or headache      Medications:    Infusion Medications    dextrose 5% and 0.2% NaCl with KCl 20 mEq 150 mL/hr at 03/14/23 1514    sodium chloride       Scheduled Medications    sodium chloride flush  5-40 mL IntraVENous 2 times per day    pantoprazole (PROTONIX) 40 mg injection  40 mg IntraVENous Q12H    piperacillin-tazobactam  3,375 mg IntraVENous Q12H    ammonium lactate   Topical Daily    enoxaparin  1 mg/kg SubCUTAneous Daily     PRN Meds: sodium chloride flush, sodium chloride, [Held by provider] ondansetron **OR** [Held by provider] ondansetron, polyethylene glycol, acetaminophen **OR** acetaminophen, trimethobenzamide    Intake/Output Summary (Last 24 hours) at 3/14/2023 1752  Last data filed at 3/14/2023 0631  Gross per 24 hour   Intake --   Output 1450 ml   Net -1450 ml       Exam:  BP (!) 123/49   Pulse 68   Temp 98.2 °F (36.8 °C) (Axillary)   Resp (!) 8   Ht 6' (1.829 m)   Wt 209 lb 7 oz (95 kg)   SpO2 97%   BMI 28.40 kg/m²   Head: Normocephalic, atraumatic  Sclera clear  Neck JVD flat  Lungs: normal effort of breathing    Labs:   Recent Labs     03/12/23  0600 03/13/23  0406   WBC 8.9 9.1   HGB 10.1* 10.4*   HCT 30.0* 30.4*   PLT 82* 75*       Recent Labs     03/12/23  0600 03/12/23  1751 03/13/23  0406 03/14/23  0510   * 150* 151* 145*   K 3.2* 2.8* 3.7 3.5   * 119* 119* 117*   CO2 21 21 21 19*   BUN 92* 93* 89* 81*   CREATININE 3.28* 3.11* 3.18* 2.92*   CALCIUM 7.8* 7.6* 7.5* 7.5*   AST 90*  --  82*  --    *  --  161*  --    BILITOT 1.3*  --  1.1*  --    ALKPHOS 82  --  106*  --        No results for input(s): INR in the last 72 hours.   Recent Labs     03/13/23  0406 03/13/23  0919 03/14/23  0510   CKTOTAL 448* 394* 229*       Radiology:  XR CHEST PORTABLE   Final Result   Right internal jugular line tip in the proximal SVC. No pneumothorax. XR FEMUR RIGHT (MIN 2 VIEWS)   Final Result   No acute osseous abnormality. XR HIP 2-3 VW W PELVIS RIGHT   Final Result   No acute abnormality of the pelvis and right hip. CT Head W/O Contrast   Final Result   CT HEAD WITHOUT CONTRAST:      1. No skull fracture or acute intracranial abnormality. CT CERVICAL SPINE WITHOUT CONTRAST:      1. No fracture or joint dislocation is seen in the cervical spine. 2. Mild age indeterminate compression fracture deformity in the T1 vertebral   body. If indicated, MRI may be obtained for further evaluation. 3. Degenerative changes, as described. CT CERVICAL SPINE WO CONTRAST   Final Result   CT HEAD WITHOUT CONTRAST:      1. No skull fracture or acute intracranial abnormality. CT CERVICAL SPINE WITHOUT CONTRAST:      1. No fracture or joint dislocation is seen in the cervical spine. 2. Mild age indeterminate compression fracture deformity in the T1 vertebral   body. If indicated, MRI may be obtained for further evaluation. 3. Degenerative changes, as described. CT THORACIC SPINE WO CONTRAST   Final Result   No evidence of acute thoracic spine trauma. CT LUMBAR SPINE WO CONTRAST   Final Result   No evidence of acute lumbar spine trauma. Multilevel degenerative changes. CT CHEST WO CONTRAST   Final Result   Atraumatic appearance of the chest.      Patchy right upper lobe opacities consistent with pneumonia. CT ABDOMEN PELVIS WO CONTRAST Additional Contrast? None   Final Result   Atraumatic appearance of the abdomen and pelvis. Prostatomegaly. Right renal atrophy. Assessment/Plan:    Rhabdomyolysis secondary to fall: Substantially improved. Acute kidney injury: Slow to improve. We will need to watch closely with nephrology who is primarily managing. Encephalopathy: waxing and waning.   Hospice consulted per family wishes    Elevated troponin: Appreciate cardiology consult. Cardiology primarily reviewing and managing troponin.     HTN: monitor BP, adjust meds as needed    35 minutes total care time, >1/2 in unit/floor time and care coordination        Riana Sanford MD

## 2023-03-14 NOTE — PROGRESS NOTES
Progress Note  Patient: Nga Chen  Unit/Bed: L441/K615-79  YOB: 1940  MRN: [id number]  Acct: [de-identified]   Admitting Diagnosis: Shock ([facility] [R57.9]  Disorientation [R41.0]  Elevated troponin [R77.8]  LEORA (acute kidney injury) ([facility] [N17.9]  Hypothermia, initial encounter [T68. XXXA]  Traumatic rhabdomyolysis, initial encounter ([facility] Uzma Javed. 6XXA]  Hypotension, unspecified hypotension type [I95.9]  Pneumonia of left lower lobe due to infectious organism [J18.9]  Date:  3/10/2023  Hospital Day: 4    Chief Complaint:  Fall    Subjective    3/14/23: Resting quietly in bed with son at bedside. He is awake but confused. He is unable to identify his son at bedside. Per son, patient is talking to himself and people who are not in the room. Patient's CODE STATUS has been changed to DNR CC. Son planning to meet with hospice tomorrow. He is hemodynamically stable. Slight improvement in renal function compared to prior with creatinine of 2.92, BUN 81 and GFR of 20.4. Hypernatremia improving with sodium of 145 today compared to 151 yesterday. Potassium stable. CK level continues to improve currently at 229. AST and ALT improving as well. Per son, prior to this admission, patient was living independently at home and cognitively intact aside from being forgetful at times. 3/13/23: Patient is resting comfort. No new events overnight    3/12/23:  Patient is a 78 y.o. male who presents with a chief complaint of fall. Patient is followed on a regular basis by Dr. Yaima De La Rosa primary care provider on file. Patient currently seen intensive care unit. Unable to provide any information due to mental status change, most of the information was obtained from the staff as well as chart. Apparently patient was found down on the ground after 4 days. Apparently he was independent up to few weeks ago and driving. Patient noted to be hypotensive as well as hypothermic on arrival to the emergency department.   Does have history of chronic DVT on oral anticoagulation, hyperlipidemia, hypertension hypothyroidism, aortic stenosis as well as dilated ascending aorta. Noted to have total CK of 2700 and mildly elevated cardiac enzyme and a flat pattern potassium of three and acute kidney injury of 3.28 creatinine. Patient is DNR CCA. Review of Systems:   Review of Systems   Unable to perform ROS: Mental status change       Physical Examination:    BP (!) 123/49   Pulse 68   Temp 98.2 °F (36.8 °C) (Axillary)   Resp (!) 8   Ht 6' (1.829 m)   Wt 209 lb 7 oz (95 kg)   SpO2 97%   BMI 28.40 kg/m²    Physical Exam  Constitutional:       General: He is not in acute distress. HENT:      Head: Normocephalic and atraumatic. Cardiovascular:      Rate and Rhythm: Normal rate and regular rhythm. Heart sounds: Murmur heard. Pulmonary:      Effort: Pulmonary effort is normal. No respiratory distress. Breath sounds: No wheezing, rhonchi or rales. Abdominal:      Palpations: Abdomen is soft. Musculoskeletal:      Cervical back: Normal range of motion and neck supple. Right lower leg: No edema. Left lower leg: No edema. Skin:     General: Skin is warm and dry. Findings: Bruising present. Neurological:      General: No focal deficit present. Mental Status: He is alert.       Comments: Confused   Psychiatric:         Mood and Affect: Mood normal.       LABS:  CBC:   Lab Results   Component Value Date/Time    WBC 9.1 03/13/2023 04:06 AM    RBC 3.25 03/13/2023 04:06 AM    HGB 10.4 03/13/2023 04:06 AM    HCT 30.4 03/13/2023 04:06 AM    MCV 93.5 03/13/2023 04:06 AM    MCH 31.9 03/13/2023 04:06 AM    MCHC 34.1 03/13/2023 04:06 AM    RDW 14.6 03/13/2023 04:06 AM    PLT 75 03/13/2023 04:06 AM     CBC with Differential:   Lab Results   Component Value Date/Time    WBC 9.1 03/13/2023 04:06 AM    RBC 3.25 03/13/2023 04:06 AM    HGB 10.4 03/13/2023 04:06 AM    HCT 30.4 03/13/2023 04:06 AM    PLT 75 03/13/2023 04:06 AM    MCV 93.5 03/13/2023 04:06 AM    MCH 31.9 03/13/2023 04:06 AM    MCHC 34.1 03/13/2023 04:06 AM    RDW 14.6 03/13/2023 04:06 AM    NRBC 1 03/13/2023 04:06 AM    BANDSPCT 13 03/13/2023 04:06 AM    LYMPHOPCT 5.0 03/13/2023 04:06 AM    MONOPCT 3.7 03/13/2023 04:06 AM    BASOPCT 0.3 03/13/2023 04:06 AM    MONOSABS 0.0 03/13/2023 04:06 AM    LYMPHSABS 0.5 03/13/2023 04:06 AM    EOSABS 0.0 03/13/2023 04:06 AM    BASOSABS 0.0 03/13/2023 04:06 AM     CMP:    Lab Results   Component Value Date/Time     03/14/2023 05:10 AM    K 3.5 03/14/2023 05:10 AM     03/14/2023 05:10 AM    CO2 19 03/14/2023 05:10 AM    BUN 81 03/14/2023 05:10 AM    CREATININE 2.92 03/14/2023 05:10 AM    LABGLOM 20.4 03/14/2023 05:10 AM    GLUCOSE 156 03/14/2023 05:10 AM    PROT 4.4 03/13/2023 04:06 AM    LABALBU 1.9 03/13/2023 04:06 AM    CALCIUM 7.5 03/14/2023 05:10 AM    BILITOT 1.1 03/13/2023 04:06 AM    ALKPHOS 106 03/13/2023 04:06 AM    AST 82 03/13/2023 04:06 AM     03/13/2023 04:06 AM     BMP:    Lab Results   Component Value Date/Time     03/14/2023 05:10 AM    K 3.5 03/14/2023 05:10 AM     03/14/2023 05:10 AM    CO2 19 03/14/2023 05:10 AM    BUN 81 03/14/2023 05:10 AM    LABALBU 1.9 03/13/2023 04:06 AM    CREATININE 2.92 03/14/2023 05:10 AM    CALCIUM 7.5 03/14/2023 05:10 AM    LABGLOM 20.4 03/14/2023 05:10 AM    GLUCOSE 156 03/14/2023 05:10 AM     Magnesium:    Lab Results   Component Value Date/Time    MG 2.2 03/13/2023 04:06 AM     Troponin:    Lab Results   Component Value Date/Time    TROPONINI 0.060 03/11/2023 09:18 AM       Radiology:  No results found. Echocardiogram 12/13/22 at [facility]:  Impression  Performed by [address] [place] [address]:   - Technically difficult exam due to suboptimal positioning, Patient struggled   laying completely on left side and body habitus. - Exam indication: f/u Aortic Stenosis   - The left ventricle is normal in size.  There is mild concentric left ventricular   hypertrophy. Left ventricular systolic function is normal. EF = 58 ± 5% (2D 4-ch.)    Left ventricular diastolic function was not evaluated due to an arrhythmia. - The right ventricle is normal in size. Right ventricular systolic function is   normal.   - The visualized aorta is dilated with a maximal dimension of 4.2 cm at the mid   ascending aorta, 4.0 cm at the distal ascending aorta. - Mild aortic stenosis with peak/mean gradients of 24/14 mmHg. Gradients averaged   today, prior AV peak/mean gradient of 22/11mmHg. - Exam was compared with the prior CC echocardiographic exam performed on 3/11/20. Ascending aorta of 4.2cm today, prior of 3.9cm.        Electronically signed by Alphonso Reis MD on 12/13/2022 at 6:09:08 PM      Assessment:    Active Hospital Problems    Diagnosis Date Noted    LEORA (acute kidney injury) ([facility] [N17.9] 03/13/2023     Priority: Medium    Acute encephalopathy [G93.40] 03/13/2023     Priority: Medium    Shock ([facility] [R57.9] 03/10/2023     Priority: Medium     S/p septic shock  Rhabdomyolysis--CK levels improving  Elevated troponin  LEORA  Encephalopathy  Ascending aortic aneurysm measuring 4.2cm per echo 12/13/22 at [facility]  Hx normal LVF EF 58% per echo 12/13/22  Mild aortic stenosis  Hx HTN  Dyslipidemia  Hx DVT      Plan:  Continue current medications-Zosyn 3375 mg IV every 12 hours, Protonix 40 mg IV every 12 hours, Lovenox 1 mg/kg subcu daily  Avoid any nephrotoxic agents secondary to ELORA  No statin secondary to rhabdomyolysis/elevated CK levels  Consider resumption of Toprol-XL in future if BP remains stable  Maintain potassium greater than 4, magnesium greater than 2  GI/DVT prophylaxis  Conservative medical therapy from cardiac standpoint secondary to advanced age, multiple comorbidities and DNR CC CODE STATUS  Plan for hospice referral meeting tomorrow with patient's son  No further recommendations from cardiac standpoint        Electronically signed by Leah Laird KRISTINE Caal on 3/14/2023 at 12:55 PM      Attending Supervising [de-identified] Attestation Statement  The patient is a 78 y.o. male. I have performed a history and physical examination of the patient. I discussed the case with the physician assistant. I reviewed the patient's Past Medical History, Past Surgical History, Medications, and Allergies. Physical Exam:  Vitals:    03/14/23 0600 03/14/23 0715 03/14/23 0719 03/14/23 1934   BP:  (!) 123/49 (!) 123/49 (!) 129/57   Pulse:   68 66   Resp:   (!) 8 18   Temp:  97.2 °F (36.2 °C) 98.2 °F (36.8 °C) 97.6 °F (36.4 °C)   TempSrc:  Axillary Axillary Axillary   SpO2:   97%    Weight: 209 lb 7 oz (95 kg)      Height:               Pulmonary/Chest: decreased breath sounds noted  Cardiovascular: normal rate, normal S1 and S2, no gallops, intact distal pulses, and no carotid bruits  Abdomen: soft, non-tender, non-distended, normal bowel sounds, no masses or organomegaly    Active Hospital Problems    Diagnosis Date Noted    LEORA (acute kidney injury) ([address] [N17.9] 03/13/2023     Priority: Medium    Acute encephalopathy [G93.40] 03/13/2023     Priority: Medium    Shock ([address] [R57.9] 03/10/2023     Priority: Medium        I reviewed and agree with the findings and plan documented in her note . ASSESSMENT:     Status post septic shock  Rhabdomyolysis  Elevated cardiac enzyme-demand ischemia  Acute kidney injury  Electrolyte abnormality-hypo--improved  Mild aortic stenosis  Aneurysmal ascending aorta measuring 4.2 cm  Primary hypertension  Hyperlipidemia  History of deep vein thrombosis. PLAN:   Conservative medical therapy from cardiology standpoint. Family deciding on hospice  As always, aggressive risk factor modification is strongly recommended. We should adhere to the JNC VIII guidelines for HTN management and the NCEPATP III guidelines for LDL-C management  IV fluids  Avoid nephrotoxic agents  Monitor on telemetry  Maximize cardiac medications. Continue with full dose Lovenox for now given history of chronic DVTe  No need for repeat echocardiogram.  Recent echo in December 2023 with normal LV function mild aortic stenosis  GI/DVT prophylaxis  Maintain potassium between 4-5 magnesium greater than 2          Thank you for allowing me to participate in the care of your patient, please don't hesitate to contact me if you have any further questions.          Electronically signed by Aydee Galaviz DO on 3/14/23 at 10:25 PM EDT

## 2023-03-15 VITALS
SYSTOLIC BLOOD PRESSURE: 129 MMHG | TEMPERATURE: 97.6 F | HEART RATE: 66 BPM | HEIGHT: 72 IN | RESPIRATION RATE: 18 BRPM | DIASTOLIC BLOOD PRESSURE: 57 MMHG | BODY MASS INDEX: 28.96 KG/M2 | WEIGHT: 213.85 LBS | OXYGEN SATURATION: 97 %

## 2023-03-15 PROBLEM — A41.9 SEVERE SEPSIS (HCC): Status: ACTIVE | Noted: 2023-03-15

## 2023-03-15 PROBLEM — R65.20 SEVERE SEPSIS (HCC): Status: ACTIVE | Noted: 2023-03-15

## 2023-03-15 LAB — BACTERIA BLD CULT: NORMAL

## 2023-03-15 PROCEDURE — 6360000002 HC RX W HCPCS: Performed by: INTERNAL MEDICINE

## 2023-03-15 PROCEDURE — 2580000003 HC RX 258: Performed by: INTERNAL MEDICINE

## 2023-03-15 PROCEDURE — 2500000003 HC RX 250 WO HCPCS: Performed by: INTERNAL MEDICINE

## 2023-03-15 PROCEDURE — 99232 SBSQ HOSP IP/OBS MODERATE 35: CPT | Performed by: INTERNAL MEDICINE

## 2023-03-15 RX ADMIN — PIPERACILLIN AND TAZOBACTAM 3375 MG: 3; .375 INJECTION, POWDER, FOR SOLUTION INTRAVENOUS at 10:17

## 2023-03-15 RX ADMIN — POTASSIUM CHLORIDE, DEXTROSE MONOHYDRATE AND SODIUM CHLORIDE: 150; 5; 200 INJECTION, SOLUTION INTRAVENOUS at 05:13

## 2023-03-15 RX ADMIN — DEXTROSE MONOHYDRATE 500 ML: 50 INJECTION, SOLUTION INTRAVENOUS at 10:05

## 2023-03-15 NOTE — PROGRESS NOTES
INPATIENT PROGRESS NOTES    PATIENT NAME: Keegan Godoy  MRN: [id number]  SERVICE DATE:  March 15, 2023   SERVICE TIME:  12:12 PM      PRIMARY SERVICE: Pulmonary Disease    CHIEF COMPLAIN: Septic shock      INTERVAL HPI: Patient seen and examined at bedside, Interval Notes, orders reviewed. Nursing notes noted  Patient is talking  but very confused.  Family is going to have hospice meeting today.  He is on 3 L O2 via nasal cannula O2 saturation 97%.   He is on broad-spectrum antibiotic with IV Zosyn.  As per RN family may decide for hospice today       OBJECTIVE    Body mass index is 29 kg/m².    PHYSICAL EXAM:  Vitals:  BP (!) 129/57   Pulse 66   Temp 97.6 °F (36.4 °C) (Axillary)   Resp 18   Ht 6' (1.829 m)   Wt 213 lb 13.5 oz (97 kg)   SpO2 97%   BMI 29.00 kg/m²   General: Confused, more awake today comfortable in bed, No distress.  Head: Atraumatic , Normocephalic   Eyes: PERRL. No sclera icterus. No conjunctival injection. No discharge   ENT: No nasal  discharge. Pharynx clear.  Neck:  Trachea midline. No thyromegaly, no JVD, No cervical adenopathy.  Chest : Bilaterally symmetrical ,Normal effort,  No accessory muscle use  Lung : Diminished BS bilateral, decreased BS at bases. No Rales. No wheezing. No rhonchi.   Heart:: Normal  rate. Regular rhythm. No mumur ,  Rub or gallop  ABD: Non-tender. Non-distended. No masses. No organmegaly. Normal bowel sounds. No hernia.  Ext : No Pitting both leg , No Cyanosis No clubbing  Neuro: no focal weakness          DATA:   Recent Labs     03/13/23  0406   WBC 9.1   HGB 10.4*   HCT 30.4*   MCV 93.5*   PLT 75*     Recent Labs     03/13/23  0406 03/14/23  0510   * 145*   K 3.7 3.5   * 117*   CO2 21 19*   BUN 89* 81*   CREATININE 3.18* 2.92*   GLUCOSE 165* 156*   CALCIUM 7.5* 7.5*   PROT 4.4*  --    LABALBU 1.9*  --    BILITOT 1.1*  --    ALKPHOS 106*  --    AST 82*  --    *  --    LABGLOM 18.4* 20.4*   GLOB 2.5  --        MV Settings:          No  results for input(s): PHART, IHN0PGV, PO2ART, NLD2JHE, BEART, R9VMTNGH in the last 72 hours. O2 Device: Nasal cannula  O2 Flow Rate (L/min): 2 L/min    No diet orders on file     MEDICATIONS during current hospitalization:    Continuous Infusions:   dextrose 5% and 0.2% NaCl with KCl 20 mEq 150 mL/hr at 03/15/23 0513    sodium chloride         Scheduled Meds:   sodium chloride flush  5-40 mL IntraVENous 2 times per day    pantoprazole (PROTONIX) 40 mg injection  40 mg IntraVENous Q12H    piperacillin-tazobactam  3,375 mg IntraVENous Q12H    ammonium lactate   Topical Daily    enoxaparin  1 mg/kg SubCUTAneous Daily       PRN Meds:sodium chloride flush, sodium chloride, [Held by provider] ondansetron **OR** [Held by provider] ondansetron, polyethylene glycol, acetaminophen **OR** acetaminophen, trimethobenzamide    Radiology  CT ABDOMEN PELVIS WO CONTRAST Additional Contrast? None    Result Date: 3/10/2023  EXAMINATION: CT OF THE ABDOMEN AND PELVIS WITHOUT CONTRAST 3/10/2023 4:54 pm TECHNIQUE: CT of the abdomen and pelvis was performed without the administration of intravenous contrast. Multiplanar reformatted images are provided for review. Automated exposure control, iterative reconstruction, and/or weight based adjustment of the mA/kV was utilized to reduce the radiation dose to as low as reasonably achievable. COMPARISON: None. HISTORY: ORDERING SYSTEM PROVIDED HISTORY: fall TECHNOLOGIST PROVIDED HISTORY: Reason for exam:->fall Additional Contrast?->None Decision Support Exception - unselect if not a suspected or confirmed emergency medical condition->Emergency Medical Condition ([place]) What reading provider will be dictating this exam?->CRC FINDINGS: Lower Chest: Left lower lobe opacity consistent with atelectasis although pneumonia not totally excludable. Organs: The liver, spleen, adrenal glands, pancreas are normal.  Gallbladder is mildly distended with gallbladder sludge. Right renal atrophy.  GI/Bowel: Diffuse colonic fecal retention. Normal small bowel and appendix. Pelvis: Prostatomegaly. Arce catheter in a decompressed urinary bladder. Peritoneum/Retroperitoneum: No free fluid or free air. Bones/Soft Tissues:   Right inguinal hernia containing normal loops of bowel. Degenerative changes lumbar spine. No fractures. Atraumatic appearance of the abdomen and pelvis. Prostatomegaly. Right renal atrophy. XR FEMUR RIGHT (MIN 2 VIEWS)    Result Date: 3/10/2023  EXAMINATION: XRAY VIEWS OF THE RIGHT FEMUR 3/10/2023 5:08 pm COMPARISON: None. HISTORY: ORDERING SYSTEM PROVIDED HISTORY: fall TECHNOLOGIST PROVIDED HISTORY: Reason for exam:->fall What reading provider will be dictating this exam?->CRC FINDINGS: There is no evidence of acute fracture. There is normal alignment. No acute joint abnormality. No focal osseous lesion. No focal soft tissue abnormality. No acute osseous abnormality. CT Head W/O Contrast    Result Date: 3/10/2023  EXAMINATION: CT OF THE HEAD WITHOUT CONTRAST; CT OF THE CERVICAL SPINE WITHOUT CONTRAST 3/10/2023 4:54 pm TECHNIQUE: CT of the head was performed without the administration of intravenous contrast. Automated exposure control, iterative reconstruction, and/or weight based adjustment of the mA/kV was utilized to reduce the radiation dose to as low as reasonably achievable.; CT of the cervical spine was performed without the administration of intravenous contrast. Multiplanar reformatted images are provided for review. Automated exposure control, iterative reconstruction, and/or weight based adjustment of the mA/kV was utilized to reduce the radiation dose to as low as reasonably achievable. COMPARISON: None. HISTORY: ORDERING SYSTEM PROVIDED HISTORY: fall, confusion TECHNOLOGIST PROVIDED HISTORY: Reason for exam:->fall, confusion Has a \"code stroke\" or \"stroke alert\" been called? ->No Decision Support Exception - unselect if not a suspected or confirmed emergency medical condition->Emergency Medical Condition (MA) What reading provider will be dictating this exam?->CRC FINDINGS: CT OF THE BRAIN: BRAIN/VENTRICLES: No mass effect, edema or hemorrhage is seen. Mild volume loss is seen in the cerebrum with mild chronic microvascular ischemic changes. No hydrocephalus or extra-axial fluid is seen. ORBITS: Prosthetic lenses are seen in the globes bilaterally. The orbits are otherwise grossly unremarkable. The the SINUSES: Mild mucosal thickening is seen in the paranasal sinuses. Small effusion in the right mastoid air cells. The left mastoid air cells are clear. SOFT TISSUES/SKULL: No acute abnormality of the visualized skull or soft tissues. CT CERVICAL SPINE: BONES/ALIGNMENT: No fracture or joint dislocation is seen in the cervical spine. Mild age indeterminate compression fracture deformity along the superior endplate of the T1 vertebral body. DEGENERATIVE CHANGES: Moderate loss of disc heights at C5-6 and C6-7. Mild central canal scratch the small disc bulge results in mild central canal stenosis at C3-4. Multilevel facet and uncovertebral joint hypertrophic changes resulting in variable degrees of neural foraminal stenoses, worst (moderate to severe) at the left C3-4 level. SOFT TISSUES: There is no prevertebral soft tissue swelling. CT HEAD WITHOUT CONTRAST: 1. No skull fracture or acute intracranial abnormality. CT CERVICAL SPINE WITHOUT CONTRAST: 1. No fracture or joint dislocation is seen in the cervical spine. 2. Mild age indeterminate compression fracture deformity in the T1 vertebral body. If indicated, MRI may be obtained for further evaluation. 3. Degenerative changes, as described. CT CHEST WO CONTRAST    Result Date: 3/10/2023  EXAMINATION: CT OF THE CHEST WITHOUT CONTRAST 3/10/2023 4:54 pm TECHNIQUE: CT of the chest was performed without the administration of intravenous contrast. Multiplanar reformatted images are provided for review.  Automated exposure control, iterative reconstruction, and/or weight based adjustment of the mA/kV was utilized to reduce the radiation dose to as low as reasonably achievable. COMPARISON: None. HISTORY: ORDERING SYSTEM PROVIDED HISTORY: fall TECHNOLOGIST PROVIDED HISTORY: Reason for exam:->fall Decision Support Exception - unselect if not a suspected or confirmed emergency medical condition->Emergency Medical Condition (MA) What reading provider will be dictating this exam?->CRC FINDINGS: Mediastinum: Thoracic aorta is unremarkable. Heart and pericardium are normal.  Calcified plaque involving the coronary arteries. No significant mediastinal adenopathy. Lungs/pleura: Patchy right upper lobe lung opacities consistent with pneumonia. Bibasilar atelectasis. Upper Abdomen:   Gallbladder sludge. Soft Tissues/Bones: Fractures. Atraumatic appearance of the chest. Patchy right upper lobe opacities consistent with pneumonia. CT CERVICAL SPINE WO CONTRAST    Result Date: 3/10/2023  EXAMINATION: CT OF THE HEAD WITHOUT CONTRAST; CT OF THE CERVICAL SPINE WITHOUT CONTRAST 3/10/2023 4:54 pm TECHNIQUE: CT of the head was performed without the administration of intravenous contrast. Automated exposure control, iterative reconstruction, and/or weight based adjustment of the mA/kV was utilized to reduce the radiation dose to as low as reasonably achievable.; CT of the cervical spine was performed without the administration of intravenous contrast. Multiplanar reformatted images are provided for review. Automated exposure control, iterative reconstruction, and/or weight based adjustment of the mA/kV was utilized to reduce the radiation dose to as low as reasonably achievable. COMPARISON: None. HISTORY: ORDERING SYSTEM PROVIDED HISTORY: fall, confusion TECHNOLOGIST PROVIDED HISTORY: Reason for exam:->fall, confusion Has a \"code stroke\" or \"stroke alert\" been called? ->No Decision Support Exception - unselect if not a suspected or confirmed emergency medical condition->Emergency Medical Condition ([place]) What reading provider will be dictating this exam?->CRC FINDINGS: CT OF THE BRAIN: BRAIN/VENTRICLES: No mass effect, edema or hemorrhage is seen. Mild volume loss is seen in the cerebrum with mild chronic microvascular ischemic changes. No hydrocephalus or extra-axial fluid is seen. ORBITS: Prosthetic lenses are seen in the globes bilaterally. The orbits are otherwise grossly unremarkable. The the SINUSES: Mild mucosal thickening is seen in the paranasal sinuses. Small effusion in the right mastoid air cells. The left mastoid air cells are clear. SOFT TISSUES/SKULL: No acute abnormality of the visualized skull or soft tissues. CT CERVICAL SPINE: BONES/ALIGNMENT: No fracture or joint dislocation is seen in the cervical spine. Mild age indeterminate compression fracture deformity along the superior endplate of the T1 vertebral body. DEGENERATIVE CHANGES: Moderate loss of disc heights at C5-6 and C6-7. Mild central canal scratch the small disc bulge results in mild central canal stenosis at C3-4. Multilevel facet and uncovertebral joint hypertrophic changes resulting in variable degrees of neural foraminal stenoses, worst (moderate to severe) at the left C3-4 level. SOFT TISSUES: There is no prevertebral soft tissue swelling. CT HEAD WITHOUT CONTRAST: 1. No skull fracture or acute intracranial abnormality. CT CERVICAL SPINE WITHOUT CONTRAST: 1. No fracture or joint dislocation is seen in the cervical spine. 2. Mild age indeterminate compression fracture deformity in the T1 vertebral body. If indicated, MRI may be obtained for further evaluation. 3. Degenerative changes, as described.      CT THORACIC SPINE WO CONTRAST    Result Date: 3/10/2023  EXAMINATION: CT OF THE THORACIC SPINE WITHOUT CONTRAST  3/10/2023 4:54 pm: TECHNIQUE: CT of the thoracic spine was performed without the administration of intravenous contrast. Multiplanar reformatted images are provided for review. Automated exposure control, iterative reconstruction, and/or weight based adjustment of the mA/kV was utilized to reduce the radiation dose to as low as reasonably achievable. COMPARISON: None. HISTORY: ORDERING SYSTEM PROVIDED HISTORY: fall confusion TECHNOLOGIST PROVIDED HISTORY: Reason for exam:->fall confusion What reading provider will be dictating this exam?->CRC FINDINGS: BONES/ALIGNMENT: There is normal alignment of the spine. The vertebral body heights are maintained. No osseous destructive lesion is seen. DEGENERATIVE CHANGES: Multilevel degenerative changes. SOFT TISSUES: No paraspinal mass is seen. MISCELLANEOUS: Bilateral pulmonary opacities. No evidence of acute thoracic spine trauma. CT LUMBAR SPINE WO CONTRAST    Result Date: 3/10/2023  EXAMINATION: CT OF THE LUMBAR SPINE WITHOUT CONTRAST  3/10/2023 TECHNIQUE: CT of the lumbar spine was performed without the administration of intravenous contrast. Multiplanar reformatted images are provided for review. Adjustment of mA and/or kV according to patient size was utilized. Automated exposure control, iterative reconstruction, and/or weight based adjustment of the mA/kV was utilized to reduce the radiation dose to as low as reasonably achievable. COMPARISON: None HISTORY: ORDERING SYSTEM PROVIDED HISTORY: fall confusion TECHNOLOGIST PROVIDED HISTORY: Reason for exam:->fall [facility] Decision Support Exception - unselect if not a suspected or confirmed emergency medical condition->Emergency Medical Condition ([place]) What reading provider will be dictating this exam?->CRC FINDINGS: BONES/ALIGNMENT: There is normal alignment of the spine. The vertebral body heights are maintained. No osseous destructive lesion is seen. DEGENERATIVE CHANGES: Multilevel degenerative changes there is is SOFT TISSUES/RETROPERITONEUM: No paraspinal mass is seen. No evidence of acute lumbar spine trauma.  Multilevel degenerative changes. XR CHEST PORTABLE    Result Date: 3/10/2023  EXAMINATION: ONE XRAY VIEW OF THE CHEST 3/10/2023 7:13 pm COMPARISON: None. HISTORY: ORDERING SYSTEM PROVIDED HISTORY: central line TECHNOLOGIST PROVIDED HISTORY: Reason for exam:->central line What reading provider will be dictating this exam?->CRC FINDINGS: The lungs are without acute focal process. There is no effusion or pneumothorax. The cardiomediastinal silhouette is without acute process. The osseous structures are without acute process. Right internal jugular line tip in the proximal SVC. Right internal jugular line tip in the proximal SVC. No pneumothorax. XR HIP 2-3 VW W PELVIS RIGHT    Result Date: 3/10/2023  EXAMINATION: ONE XRAY VIEW OF THE PELVIS AND TWO XRAY VIEWS RIGHT HIP 3/10/2023 5:08 pm COMPARISON: None. HISTORY: ORDERING SYSTEM PROVIDED HISTORY: fall TECHNOLOGIST PROVIDED HISTORY: Reason for exam:->fall What reading provider will be dictating this exam?->CRC FINDINGS: The bones are demineralized. No evidence of pelvic fracture. Bilateral hips demonstrate normal alignment. No focal osseous lesion. SI joints are symmetric. Arce catheter in place. No acute abnormality of the pelvis and right hip. IMPRESSION AND SUGGESTION:  Septic shock due to bacteremia. Gram-negative pamela bacteremia. Urinary tract infection  Toxic metabolic encephalopathy. Slightly better  Acute renal failure due to shock. Hypernatremia. Worse today. Severe hypokalemia. Improved slightly  Metabolic  acidosis. Elevated LFTs. continue O2 to keep saturation 90% or above. Kidney function is worsening. Nephrology is following. Continue broad-spectrum antibiotic. Family may decide for hospice after meeting today until then continue same. NOTE: This report was transcribed using voice recognition software. Every effort was made to ensure accuracy; however, inadvertent computerized transcription errors may be present.       Electronically signed by Carmel Roman MD, [facility] on 3/15/2023 at 12:12 PM

## 2023-03-15 NOTE — PROGRESS NOTES
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  Hypernatremia  Failure to thrive  S.P hypotension      Plan:  bolus dextrose   hospice to see    Patient Active Problem List:     Shock ([facility] [address]     LEORA (acute kidney injury) ([facility] [address]     Acute encephalopathy      Subjective:  Admit Date: 3/10/2023    Interval History: encephalopathy son in room    Medications:  Scheduled Meds:   sodium chloride flush  5-40 mL IntraVENous 2 times per day    pantoprazole (PROTONIX) 40 mg injection  40 mg IntraVENous Q12H    piperacillin-tazobactam  3,375 mg IntraVENous Q12H    ammonium lactate   Topical Daily    enoxaparin  1 mg/kg SubCUTAneous Daily     Continuous Infusions:   dextrose 5% and 0.2% NaCl with KCl 20 mEq 150 mL/hr at 03/15/23 0513    sodium chloride         CBC:   Recent Labs     03/13/23  0406   WBC 9.1   HGB 10.4*   PLT 75*     CMP:    Recent Labs     03/12/23  1751 03/13/23  0406 03/14/23  0510   * 151* 145*   K 2.8* 3.7 3.5   * 119* 117*   CO2 21 21 19*   BUN 93* 89* 81*   CREATININE 3.11* 3.18* 2.92*   GLUCOSE 180* 165* 156*   CALCIUM 7.6* 7.5* 7.5*   LABGLOM 18.9* 18.4* 20.4*     Troponin: No results for input(s): TROPONINI in the last 72 hours. BNP: No results for input(s): BNP in the last 72 hours. INR: No results for input(s): INR in the last 72 hours. Lipids: No results for input(s): CHOL, LDLDIRECT, TRIG, HDL, AMYLASE, LIPASE in the last 72 hours. Liver:   Recent Labs     03/13/23  0406   AST 82*   *   ALKPHOS 106*   PROT 4.4*   LABALBU 1.9*   BILITOT 1.1*     Iron:  No results for input(s): IRONS, FERRITIN in the last 72 hours. Invalid input(s): LABIRONS  Urinalysis: No results for input(s): UA in the last 72 hours.     Objective:  Vitals: BP (!) 129/57   Pulse 66   Temp 97.6 °F (36.4 °C) (Axillary)   Resp 18   Ht 6' (1.829 m)   Wt 213 lb 13.5 oz (97 kg)   SpO2 97%   BMI 29.00 kg/m²    Wt Readings from Last 3 Encounters:   03/15/23 213 lb 13.5 oz (97 kg)      24HR INTAKE/OUTPUT:    Intake/Output Summary (Last 24 hours) at 3/15/2023 9389  Last data filed at 3/15/2023 0516  Gross per 24 hour   Intake --   Output 650 ml   Net -650 ml       General:not alert, in no apparent distress  HEENT: normocephalic, atraumatic, anicteric  Neck: supple, no mass  Lungs: non-labored respirations, clear to auscultation bilaterally  Heart: regular rate and rhythm, no murmurs or rubs  Abdomen: soft, non-tender, non-distended  Ext: no cyanosis, no peripheral edema  Neuro: alert and oriented, no gross abnormalities  Psych: normal mood and affect  Skin: no rash      Electronically signed by Ivan Mora DO, MD

## 2023-03-15 NOTE — FLOWSHEET NOTE
Pt awake in room repositioned in bed . Pt calm at this time. No distress noted. Electronically signed by Woodrow Villegas LPN on 6/23/7412 at 5:93 AM  Pt does not want to be touched pulls away from staff so you have to talk in his ear and explain. Hospice here to talk with family. Electronically signed by Woodrow Villegas LPN on 1/94/2404 at 96:85 AM  Family met with hospice and signed up with them Son requested that we stop the fluids and the antibiotic. Dr Bhumika Goncalves notified that they are signing up and will place discharge re. Admit to hospice order in. Electronically signed by Woodrow Villegas LPN on 4/53/3456 at   Ambulance here to transport pt to hospice. So took personal belongings with him.   .Electronically signed by Woodrow Villegas LPN on 0/10/7220 at 0:89 PM

## 2023-03-15 NOTE — CONSULTS
This RN had re-meet with pt's family. Consents were signed and pt will be transported to [address] [place] [address] today at  [facility] under routine level of care.

## 2023-03-16 NOTE — PROGRESS NOTES
Physical Therapy  Facility/Department: [facility] [id number]  Physical Therapy Discharge      NAME: Ha Bates    : 1940 (78 y.o.)  MRN: [id number]    Account: [de-identified]  Gender: male      Patient has been discharged from acute care hospital. DC patient from current PT program.      Electronically signed by Trista Banks PT on 3/16/23 at 4:14 PM EDT

## 2023-06-21 NOTE — ED TRIAGE NOTES
Per EMS pt was found on the floor in his home after son tired to contact him without success. Per sone he last spoke to the pt on Sunday night. Son states that the pt had 4 papers on his porch and he get then every day. Pt at this time is a+ox1, confused, unable to answer questions. Home